# Patient Record
Sex: FEMALE | Race: WHITE | NOT HISPANIC OR LATINO | Employment: OTHER | URBAN - METROPOLITAN AREA
[De-identification: names, ages, dates, MRNs, and addresses within clinical notes are randomized per-mention and may not be internally consistent; named-entity substitution may affect disease eponyms.]

---

## 2017-04-10 ENCOUNTER — RECORDS - HEALTHEAST (OUTPATIENT)
Dept: LAB | Facility: CLINIC | Age: 37
End: 2017-04-10

## 2017-04-10 LAB
CHOLEST SERPL-MCNC: 169 MG/DL
FASTING STATUS PATIENT QL REPORTED: NORMAL
HDLC SERPL-MCNC: 52 MG/DL
LDLC SERPL CALC-MCNC: 104 MG/DL
TRIGL SERPL-MCNC: 66 MG/DL

## 2017-04-13 LAB
BKR LAB AP ABNORMAL BLEEDING: NO
BKR LAB AP BIRTH CONTROL/HORMONES: NORMAL
BKR LAB AP CERVICAL APPEARANCE: NORMAL
BKR LAB AP GYN ADEQUACY: NORMAL
BKR LAB AP GYN INTERPRETATION: NORMAL
BKR LAB AP HPV REFLEX: NORMAL
BKR LAB AP LMP: NORMAL
BKR LAB AP PATIENT STATUS: NORMAL
BKR LAB AP PREVIOUS ABNORMAL: NORMAL
BKR LAB AP PREVIOUS NORMAL: 2014
HIGH RISK?: NO
PATH REPORT.COMMENTS IMP SPEC: NORMAL
RESULT FLAG (HE HISTORICAL CONVERSION): NORMAL

## 2017-07-29 ENCOUNTER — HEALTH MAINTENANCE LETTER (OUTPATIENT)
Age: 37
End: 2017-07-29

## 2018-04-18 ENCOUNTER — RECORDS - HEALTHEAST (OUTPATIENT)
Dept: ADMINISTRATIVE | Facility: OTHER | Age: 38
End: 2018-04-18

## 2018-05-03 ENCOUNTER — RECORDS - HEALTHEAST (OUTPATIENT)
Dept: ADMINISTRATIVE | Facility: OTHER | Age: 38
End: 2018-05-03

## 2018-05-07 ENCOUNTER — RECORDS - HEALTHEAST (OUTPATIENT)
Dept: ADMINISTRATIVE | Facility: OTHER | Age: 38
End: 2018-05-07

## 2018-05-23 ENCOUNTER — RECORDS - HEALTHEAST (OUTPATIENT)
Dept: ADMINISTRATIVE | Facility: OTHER | Age: 38
End: 2018-05-23

## 2018-05-23 LAB
BKR LAB AP ABNORMAL BLEEDING: NO
BKR LAB AP BIRTH CONTROL/HORMONES: ABNORMAL
BKR LAB AP CERVICAL APPEARANCE: NORMAL
BKR LAB AP GYN ADEQUACY: ABNORMAL
BKR LAB AP GYN INTERPRETATION: ABNORMAL
BKR LAB AP HPV REFLEX: ABNORMAL
BKR LAB AP LMP: ABNORMAL
BKR LAB AP PATIENT STATUS: ABNORMAL
BKR LAB AP PREVIOUS ABNORMAL: ABNORMAL
BKR LAB AP PREVIOUS NORMAL: ABNORMAL
HIGH RISK?: NO
PATH REPORT.COMMENTS IMP SPEC: ABNORMAL
RESULT FLAG (HE HISTORICAL CONVERSION): ABNORMAL

## 2018-05-24 LAB
HPV SOURCE: NORMAL
HUMAN PAPILLOMA VIRUS 16 DNA: NEGATIVE
HUMAN PAPILLOMA VIRUS 18 DNA: NEGATIVE
HUMAN PAPILLOMA VIRUS FINAL DIAGNOSIS: NORMAL
HUMAN PAPILLOMA VIRUS OTHER HR: NEGATIVE
SPECIMEN DESCRIPTION: NORMAL

## 2018-07-02 ENCOUNTER — OFFICE VISIT - HEALTHEAST (OUTPATIENT)
Dept: RHEUMATOLOGY | Facility: CLINIC | Age: 38
End: 2018-07-02

## 2018-07-02 DIAGNOSIS — I73.00 RAYNAUD'S DISEASE WITHOUT GANGRENE: ICD-10-CM

## 2018-07-02 DIAGNOSIS — M54.50 CHRONIC BILATERAL LOW BACK PAIN WITHOUT SCIATICA: ICD-10-CM

## 2018-07-02 DIAGNOSIS — M25.50 POLYARTHRALGIA: ICD-10-CM

## 2018-07-02 DIAGNOSIS — G89.29 CHRONIC BILATERAL LOW BACK PAIN WITHOUT SCIATICA: ICD-10-CM

## 2018-07-02 LAB
ALBUMIN SERPL-MCNC: 4.5 G/DL (ref 3.5–5)
ALT SERPL W P-5'-P-CCNC: 10 U/L (ref 0–45)
BASOPHILS # BLD AUTO: 0 THOU/UL (ref 0–0.2)
BASOPHILS NFR BLD AUTO: 0 % (ref 0–2)
C REACTIVE PROTEIN LHE: <0.1 MG/DL (ref 0–0.8)
CK SERPL-CCNC: 83 U/L (ref 30–190)
CREAT SERPL-MCNC: 0.84 MG/DL (ref 0.6–1.1)
EOSINOPHIL # BLD AUTO: 0 THOU/UL (ref 0–0.4)
EOSINOPHIL NFR BLD AUTO: 1 % (ref 0–6)
ERYTHROCYTE [DISTWIDTH] IN BLOOD BY AUTOMATED COUNT: 14 % (ref 11–14.5)
ERYTHROCYTE [SEDIMENTATION RATE] IN BLOOD BY WESTERGREN METHOD: 14 MM/HR (ref 0–20)
GFR SERPL CREATININE-BSD FRML MDRD: >60 ML/MIN/1.73M2
HCT VFR BLD AUTO: 33.5 % (ref 35–47)
HGB BLD-MCNC: 11 G/DL (ref 12–16)
LYMPHOCYTES # BLD AUTO: 2.1 THOU/UL (ref 0.8–4.4)
LYMPHOCYTES NFR BLD AUTO: 25 % (ref 20–40)
MCH RBC QN AUTO: 26.6 PG (ref 27–34)
MCHC RBC AUTO-ENTMCNC: 32.9 G/DL (ref 32–36)
MCV RBC AUTO: 81 FL (ref 80–100)
MONOCYTES # BLD AUTO: 0.5 THOU/UL (ref 0–0.9)
MONOCYTES NFR BLD AUTO: 6 % (ref 2–10)
NEUTROPHILS # BLD AUTO: 5.7 THOU/UL (ref 2–7.7)
NEUTROPHILS NFR BLD AUTO: 69 % (ref 50–70)
PLATELET # BLD AUTO: 329 THOU/UL (ref 140–440)
PMV BLD AUTO: 7.6 FL (ref 7–10)
RBC # BLD AUTO: 4.14 MILL/UL (ref 3.8–5.4)
RHEUMATOID FACT SERPL-ACNC: <15 IU/ML (ref 0–30)
WBC: 8.3 THOU/UL (ref 4–11)

## 2018-07-03 LAB
ANA SER QL: 0.3 U
CCP AB SER IA-ACNC: <0.5 U/ML
HBV SURFACE AG SERPL QL IA: NEGATIVE
HCV AB SERPL QL IA: NEGATIVE

## 2018-07-04 LAB — ANCA IGG TITR SER IF: NORMAL {TITER}

## 2018-09-05 ENCOUNTER — OFFICE VISIT - HEALTHEAST (OUTPATIENT)
Dept: RHEUMATOLOGY | Facility: CLINIC | Age: 38
End: 2018-09-05

## 2018-09-05 DIAGNOSIS — M25.50 POLYARTHRALGIA: ICD-10-CM

## 2018-10-26 ENCOUNTER — RECORDS - HEALTHEAST (OUTPATIENT)
Dept: LAB | Facility: CLINIC | Age: 38
End: 2018-10-26

## 2018-10-26 LAB
ALBUMIN SERPL-MCNC: 4 G/DL (ref 3.5–5)
ANION GAP SERPL CALCULATED.3IONS-SCNC: 6 MMOL/L (ref 5–18)
BUN SERPL-MCNC: 17 MG/DL (ref 8–22)
CALCIUM SERPL-MCNC: 9.7 MG/DL (ref 8.5–10.5)
CHLORIDE BLD-SCNC: 106 MMOL/L (ref 98–107)
CO2 SERPL-SCNC: 26 MMOL/L (ref 22–31)
CREAT SERPL-MCNC: 0.71 MG/DL (ref 0.6–1.1)
GFR SERPL CREATININE-BSD FRML MDRD: >60 ML/MIN/1.73M2
GLUCOSE BLD-MCNC: 93 MG/DL (ref 70–125)
PHOSPHATE SERPL-MCNC: 3.6 MG/DL (ref 2.5–4.5)
POTASSIUM BLD-SCNC: 4.6 MMOL/L (ref 3.5–5)
SODIUM SERPL-SCNC: 138 MMOL/L (ref 136–145)
TSH SERPL DL<=0.005 MIU/L-ACNC: 1.47 UIU/ML (ref 0.3–5)

## 2019-01-28 ENCOUNTER — OFFICE VISIT (OUTPATIENT)
Dept: DERMATOLOGY | Facility: CLINIC | Age: 39
End: 2019-01-28
Payer: COMMERCIAL

## 2019-01-28 DIAGNOSIS — L70.8 OTHER ACNE: Primary | ICD-10-CM

## 2019-01-28 DIAGNOSIS — L30.1 DYSHIDROTIC DERMATITIS: ICD-10-CM

## 2019-01-28 RX ORDER — EMOLLIENT BASE
CREAM (GRAM) TOPICAL PRN
Qty: 113 G | Refills: 5 | Status: SHIPPED | OUTPATIENT
Start: 2019-01-28 | End: 2020-01-28

## 2019-01-28 RX ORDER — CLINDAMYCIN PHOSPHATE 10 UG/ML
LOTION TOPICAL EVERY MORNING
Qty: 60 ML | Refills: 3 | Status: SHIPPED | OUTPATIENT
Start: 2019-01-28 | End: 2019-04-28

## 2019-01-28 RX ORDER — BORIC ACID
POWDER (GRAM) MISCELLANEOUS
Qty: 500 G | Refills: 3 | Status: SHIPPED | OUTPATIENT
Start: 2019-01-28 | End: 2020-08-31

## 2019-01-28 ASSESSMENT — PAIN SCALES - GENERAL: PAINLEVEL: NO PAIN (0)

## 2019-01-28 NOTE — LETTER
1/28/2019       RE: Zaida Zelaya  [address] [facility] [address]     Dear Colleague,    Thank you for referring your patient, Zaida Zelaya, to the [facility] DERMATOLOGY at [facility]. Please see a copy of my visit note below.    [facility] Dermatology Note      Dermatology Problem List:    Specialty Problems     None          CC:   Derm Problem (Zaida is here for plaque psoriasis and black lines in fingernails .)        Encounter Date: Jan 28, 2019    History of Present Illness:  Ms. Zaida Zelaya is a 38 year old female who presents as a referral from [place].  Patient since 2013 recurrent dermatitis on hands and feet and sometimes on scalp.Sometimes blistering on palms of hands with redness and itchy and sometimes on the feet. Usually ends up with thick, tight skin and similar lesions periauricular and belly button      Past Medical History:   Patient Active Problem List   Diagnosis     Anxiety     Major depression, recurrent (H)     Migraine     Fibromyalgia     Hyperlipidemia with target LDL less than 130     Uncomplicated varicose veins     Chronic pain     Dysthymic disorder     Pain in joint, lower leg     Synovitis and tenosynovitis     Tobacco use disorder     Raynaud's syndrome     No past medical history on file.    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  She has been smoking about 1.00 pack per day. she has never used smokeless tobacco. She reports that she does not drink alcohol or use drugs.      Family History:  Family History   Problem Relation Age of Onset     Thyroid Disease Mother      Eye Disorder Mother 50        glaucoma     Depression Father      Neurologic Disorder Father         migraines     Gastrointestinal Disease Father 50        had a colon polyp  removed     Asthma Maternal Grandmother 61     Arthritis Maternal Grandmother      Allergies Maternal Grandmother      Alcohol/Drug Maternal Grandfather 61        liver failure     Asthma Paternal Grandmother      Arthritis Paternal Grandmother      Cancer Paternal Grandmother      Cancer Paternal Grandfather 84        melonoma     Alzheimer Disease Paternal Grandfather        Medications:  Current Outpatient Medications   Medication Sig Dispense Refill     Amphetamine-Dextroamphetamine (ADDERALL PO) Take 10 mg by mouth daily In afternoon       Amphetamine-Dextroamphetamine (ADDERALL XR PO) Take 20 mg by mouth every morning       Cholecalciferol (VITAMIN D3 PO) Take 7,000 Units by mouth daily       Cranberry 1000 MG CAPS Take 1,000 mg by mouth daily. 30 capsule      ESCITALOPRAM OXALATE PO Take 20 mg by mouth daily       GABAPENTIN PO Take 300 mg by mouth 2 times daily       IBUPROFEN PO Take 800 mg by mouth every 8 hours as needed for moderate pain       Iron Polysacch [id number] (POLY-IRON 150 FORTE) 150-0.025-1 MG CAPS TAKE ONE CAPSULE BY MOUTH DAILY WITH A MEAL       LORazepam (ATIVAN) 0.5 MG tablet Take 1 tablet (0.5 mg) by mouth every 8 hours as needed for anxiety 20 tablet 0     mupirocin (BACTROBAN) 2 % ointment Also to affected crusted areas around the mouth three times daily for 5 days. 22 g 1     SUMAtriptan (IMITREX) 100 MG tablet Take 1 tablet (100 mg) by mouth at onset of headache for migraine May repeat in 2 hours if needed: max 2/day; average number of headaches monthly 4 18 tablet 3     TRAMADOL HCL PO Take 50 mg by mouth 2 times daily        venlafaxine (EFFEXOR-XR) 37.5 MG 24 hr capsule Take 37.5 mg by mouth daily       MAGNESIUM GLUCONATE PO Take 1 tablet by mouth daily        Multiple Vitamin (MULTI-VITAMINS) TABS Take 1 tablet by mouth Once a Day       ondansetron (ZOFRAN) 4 MG tablet TAKE 1 TABLET BY MOUTH EVERY 8 HOURS  2     oxyCODONE-acetaminophen (PERCOCET)  MG per tablet Take 1  tablet by mouth 2 times daily       Review of Systems:  -As per HPI  -Constitutional: The patient denies fatigue, fevers, chills, unintended weight loss, and night sweats.  -HEENT: Patient denies nonhealing oral sores.  -Skin: As above in HPI. No additional skin concerns.    Physical exam:  Vitals: There were no vitals taken for this visit.  GEN: This is a well developed, well-nourished female in no acute distress, in a pleasant mood.    SKIN: Full skin, which includes the head/face, both arms, chest, back, abdomen,both legs, genitalia and/or groin buttocks, digits and/or nails, was examined.  Hands in the moment slightly hyperkeratotic, but no signs for active dermatitis  On the feet interdigital and plantar some whitish wet desquamation and some hyperkeratosis  No clear signs on nails for psoriasis and no psoriasis lesions sacral and scalp  On the face scars from an acne excoriee and as well some excoriations.  Some erythema over belly button    -No other lesions of concern on areas examined.     Impression/Plan:    1) recurrent dermatitis with dyshidrosis and hyperkeratosis on hands and feet (sometimes scalp)    DDx hyperhidrosis/allergic contact dermatitis, unlikely psoriasis    Use boric acid powder to disinfect and keep feet dry    Use on hands and feet Vanicream cream    2) scars of excoriated acne    Evaluate with cosmetic team the possibility of peeling    Continue Tretinoin Cream and in the morning Clindamycine Lotion and BP 5% wash    Follow-up with cosmetic team    Again, thank you for allowing me to participate in the care of your patient.      Sincerely,    Morris Rojas MD

## 2019-01-29 NOTE — NURSING NOTE
Dermatology Rooming Note    Zaida Zelaya's goals for this visit include:   Chief Complaint   Patient presents with     Derm Problem     Zaida is here for plaque psoriasis and black lines in fingernails .     Jerica Mathur, CMA

## 2019-01-29 NOTE — PROGRESS NOTES
[facility] Dermatology Note      Dermatology Problem List:    Specialty Problems     None          CC:   Derm Problem (Zaida is here for plaque psoriasis and black lines in fingernails .)        Encounter Date: Jan 28, 2019    History of Present Illness:  Ms. Zaida Zelaya is a 38 year old female who presents as a referral from [place].  Patient since 2013 recurrent dermatitis on hands and feet and sometimes on scalp.Sometimes blistering on palms of hands with redness and itchy and sometimes on the feet. Usually ends up with thick, tight skin and similar lesions periauricular and belly button      Past Medical History:   Patient Active Problem List   Diagnosis     Anxiety     Major depression, recurrent (H)     Migraine     Fibromyalgia     Hyperlipidemia with target LDL less than 130     Uncomplicated varicose veins     Chronic pain     Dysthymic disorder     Pain in joint, lower leg     Synovitis and tenosynovitis     Tobacco use disorder     Raynaud's syndrome     No past medical history on file.    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  She has been smoking about 1.00 pack per day. she has never used smokeless tobacco. She reports that she does not drink alcohol or use drugs.      Family History:  Family History   Problem Relation Age of Onset     Thyroid Disease Mother      Eye Disorder Mother 50        glaucoma     Depression Father      Neurologic Disorder Father         migraines     Gastrointestinal Disease Father 50        had a colon polyp removed     Asthma Maternal Grandmother 61     Arthritis Maternal Grandmother      Allergies Maternal Grandmother      Alcohol/Drug Maternal Grandfather 61        liver failure     Asthma Paternal Grandmother      Arthritis Paternal Grandmother      Cancer Paternal  Grandmother      Cancer Paternal Grandfather 84        melonoma     Alzheimer Disease Paternal Grandfather        Medications:  Current Outpatient Medications   Medication Sig Dispense Refill     Amphetamine-Dextroamphetamine (ADDERALL PO) Take 10 mg by mouth daily In afternoon       Amphetamine-Dextroamphetamine (ADDERALL XR PO) Take 20 mg by mouth every morning       Cholecalciferol (VITAMIN D3 PO) Take 7,000 Units by mouth daily       Cranberry 1000 MG CAPS Take 1,000 mg by mouth daily. 30 capsule      ESCITALOPRAM OXALATE PO Take 20 mg by mouth daily       GABAPENTIN PO Take 300 mg by mouth 2 times daily       IBUPROFEN PO Take 800 mg by mouth every 8 hours as needed for moderate pain       Iron Polysacch [id number] (POLY-IRON 150 FORTE) 150-0.025-1 MG CAPS TAKE ONE CAPSULE BY MOUTH DAILY WITH A MEAL       LORazepam (ATIVAN) 0.5 MG tablet Take 1 tablet (0.5 mg) by mouth every 8 hours as needed for anxiety 20 tablet 0     mupirocin (BACTROBAN) 2 % ointment Also to affected crusted areas around the mouth three times daily for 5 days. 22 g 1     SUMAtriptan (IMITREX) 100 MG tablet Take 1 tablet (100 mg) by mouth at onset of headache for migraine May repeat in 2 hours if needed: max 2/day; average number of headaches monthly 4 18 tablet 3     TRAMADOL HCL PO Take 50 mg by mouth 2 times daily        venlafaxine (EFFEXOR-XR) 37.5 MG 24 hr capsule Take 37.5 mg by mouth daily       MAGNESIUM GLUCONATE PO Take 1 tablet by mouth daily        Multiple Vitamin (MULTI-VITAMINS) TABS Take 1 tablet by mouth Once a Day       ondansetron (ZOFRAN) 4 MG tablet TAKE 1 TABLET BY MOUTH EVERY 8 HOURS  2     oxyCODONE-acetaminophen (PERCOCET)  MG per tablet Take 1 tablet by mouth 2 times daily           Review of Systems:  -As per HPI  -Constitutional: The patient denies fatigue, fevers, chills, unintended weight loss, and night sweats.  -HEENT: Patient denies nonhealing oral sores.  -Skin: As above in HPI. No additional skin  concerns.    Physical exam:  Vitals: There were no vitals taken for this visit.  GEN: This is a well developed, well-nourished female in no acute distress, in a pleasant mood.    SKIN: Full skin, which includes the head/face, both arms, chest, back, abdomen,both legs, genitalia and/or groin buttocks, digits and/or nails, was examined.  Hands in the moment slightly hyperkeratotic, but no signs for active dermatitis  On the feet interdigital and plantar some whitish wet desquamation and some hyperkeratosis  No clear signs on nails for psoriasis and no psoriasis lesions sacral and scalp  On the face scars from an acne excoriee and as well some excoriations.  Some erythema over belly button    -No other lesions of concern on areas examined.     Impression/Plan:    1) recurrent dermatitis with dyshidrosis and hyperkeratosis on hands and feet (sometimes scalp)    DDx hyperhidrosis/allergic contact dermatitis, unlikely psoriasis    Use boric acid powder to disinfect and keep feet dry    Use on hands and feet Vanicream cream    2) scars of excoriated acne    Evaluate with cosmetic team the possibility of peeling    Continue Tretinoin Cream and in the morning Clindamycine Lotion and BP 5% wash        Follow-up with cosmetic team

## 2019-02-04 DIAGNOSIS — L70.8 OTHER ACNE: ICD-10-CM

## 2019-02-06 RX ORDER — CLINDAMYCIN PHOSPHATE 10 UG/ML
LOTION TOPICAL
Qty: 60 ML | Refills: 3 | OUTPATIENT
Start: 2019-02-06

## 2019-02-12 ENCOUNTER — TELEPHONE (OUTPATIENT)
Dept: DERMATOLOGY | Facility: CLINIC | Age: 39
End: 2019-02-12

## 2019-02-12 NOTE — TELEPHONE ENCOUNTER
[facility] Call Center    Phone Message    May a detailed message be left on voicemail: yes    Reason for Call: Medication Question or concern regarding medication   Prescription Clarification  Name of Medication: clindamycin (CLEOCIN T) 1 % external lotion   Prescribing Provider: Dr. Barrera   Pharmacy:[facility]--[address] [place] [address]   Requesting one day supply. Please call as soon as possible. [phone]    Action Taken: Message routed to:  [facility] ([facility] Derm Clinic

## 2019-07-27 ENCOUNTER — HOSPITAL ENCOUNTER (EMERGENCY)
Facility: CLINIC | Age: 39
Discharge: HOME | End: 2019-07-27
Attending: PHYSICIAN ASSISTANT | Admitting: PHYSICIAN ASSISTANT
Payer: COMMERCIAL

## 2019-07-27 VITALS
OXYGEN SATURATION: 100 % | WEIGHT: 132 LBS | BODY MASS INDEX: 23.39 KG/M2 | SYSTOLIC BLOOD PRESSURE: 143 MMHG | TEMPERATURE: 98.6 F | DIASTOLIC BLOOD PRESSURE: 100 MMHG | HEIGHT: 63 IN

## 2019-07-27 DIAGNOSIS — Z86.79 HISTORY OF RAYNAUD'S SYNDROME: ICD-10-CM

## 2019-07-27 DIAGNOSIS — R21 RASH AND NONSPECIFIC SKIN ERUPTION: ICD-10-CM

## 2019-07-27 DIAGNOSIS — Z76.0 ENCOUNTER FOR MEDICATION REFILL: ICD-10-CM

## 2019-07-27 PROCEDURE — 99214 OFFICE O/P EST MOD 30 MIN: CPT | Mod: Z6 | Performed by: PHYSICIAN ASSISTANT

## 2019-07-27 PROCEDURE — G0463 HOSPITAL OUTPT CLINIC VISIT: HCPCS | Performed by: PHYSICIAN ASSISTANT

## 2019-07-27 RX ORDER — MUPIROCIN 20 MG/G
OINTMENT TOPICAL 3 TIMES DAILY
Qty: 1 G | Refills: 0 | Status: SHIPPED | OUTPATIENT
Start: 2019-07-27 | End: 2019-08-01

## 2019-07-27 RX ORDER — TRAMADOL HYDROCHLORIDE 50 MG/1
50 TABLET ORAL 2 TIMES DAILY PRN
Qty: 4 TABLET | Refills: 0 | Status: SHIPPED | OUTPATIENT
Start: 2019-07-27 | End: 2020-08-31

## 2019-07-27 ASSESSMENT — MIFFLIN-ST. JEOR: SCORE: 1242.88

## 2019-07-27 ASSESSMENT — ENCOUNTER SYMPTOMS
MYALGIAS: 1
WEAKNESS: 0
FEVER: 0
ARTHRALGIAS: 1
NUMBNESS: 0

## 2019-07-27 NOTE — ED AVS SNAPSHOT
[facility] Emergency Department  [address] [facility] [address]  Phone:  [phone]  Fax:  [phone]                                    Zaida Zelaya   MRN: [id number]    Department:  [facility] Emergency Department   Date of Visit:  7/27/2019           After Visit Summary Signature Page    I have received my discharge instructions, and my questions have been answered. I have discussed any challenges I see with this plan with the nurse or doctor.    ..........................................................................................................................................  Patient/Patient Representative Signature      ..........................................................................................................................................  Patient Representative Print Name and Relationship to Patient    ..................................................               ................................................  Date                                   Time    ..........................................................................................................................................  Reviewed by Signature/Title    ...................................................              ..............................................  Date                                               Time          22EPIC Rev 08/18

## 2019-07-28 NOTE — ED PROVIDER NOTES
"  History     Chief Complaint   Patient presents with     Rash     has had and been seen for many times, is requesting antibiotic      HPI  Zaida Zelaya is a 38 year old female who presents the urgent care today for 2 issues patient's first issue is that she has a history of ray nods syndrome and sees a dermatologist and rheumatologist for psoriasis-like rash that she gets several times a year.  Patient states though that she gets a lot of sloughing and buildup of what appears to be \"skin like flaking\" all over her body.  Patient also notes that she gets areas of redness and warmth that then resolve on their own throughout her body.  Patient concerned that she needs some sort of antibiotic for this and she does have a history of MRSA.  Patient also states that her joints about flaring up a little bit with her fibromyalgia and Raynolds on until she can see her rheumatologist dermatologist on Monday she was wondering if she can get a few tramadol which she is currently out of.  Patient got all of her other medications refilled recently.  Patient denies fevers, purulent drainage, swelling, fluid like collection, crusting or vesicular rash.  Started this a few days ago.  Patient denies any chest pain, headaches, dizziness, confusion, ear pain, sore throat, sinus pain, shortness of breath, abdominal pain, nausea or vomiting.      Allergies:  No Known Allergies    Problem List:    Patient Active Problem List    Diagnosis Date Noted     Raynaud's syndrome 02/23/2014     Priority: Medium     Has seen Rheum x 2       Anxiety 02/13/2014     Priority: Medium     Major depression, recurrent (H) 02/13/2014     Priority: Medium     Migraine 02/13/2014     Priority: Medium     Fibromyalgia 02/13/2014     Priority: Medium     Hyperlipidemia with target LDL less than 130 02/13/2014     Priority: Medium     Diagnosis updated by automated process. Provider to review and confirm.       Chronic pain 09/30/2013     Priority: Medium "     Tobacco use disorder 04/21/2011     Priority: Medium     Synovitis and tenosynovitis 02/28/2011     Priority: Medium     Dysthymic disorder 11/24/2010     Priority: Medium     Uncomplicated varicose veins 11/12/2010     Priority: Medium     Pain in joint, lower leg 11/12/2010     Priority: Medium        Past Medical History:    History reviewed. No pertinent past medical history.    Past Surgical History:    Past Surgical History:   Procedure Laterality Date     CARPAL TUNNEL RELEASE RT/LT  2013    left only; [facility]      ENLARGE BREAST WITH IMPLANT  2007       Family History:    Family History   Problem Relation Age of Onset     Thyroid Disease Mother      Eye Disorder Mother 50        glaucoma     Depression Father      Neurologic Disorder Father         migraines     Gastrointestinal Disease Father 50        had a colon polyp removed     Asthma Maternal Grandmother 61     Arthritis Maternal Grandmother      Allergies Maternal Grandmother      Alcohol/Drug Maternal Grandfather 61        liver failure     Asthma Paternal Grandmother      Arthritis Paternal Grandmother      Cancer Paternal Grandmother      Cancer Paternal Grandfather 84        melonoma     Alzheimer Disease Paternal Grandfather        Social History:  Marital Status:   [4]  Social History     Tobacco Use     Smoking status: Current Every Day Smoker     Packs/day: 1.00     Types: Cigarettes     Smokeless tobacco: Never Used   Substance Use Topics     Alcohol use: No     Drug use: No        Medications:      mupirocin (BACTROBAN) 2 % external ointment   traMADol (ULTRAM) 50 MG tablet   Amphetamine-Dextroamphetamine (ADDERALL PO)   Amphetamine-Dextroamphetamine (ADDERALL XR PO)   benzoyl peroxide 5 % external liquid   boric acid topical POWD powder   Cholecalciferol (VITAMIN D3 PO)   Cranberry 1000 MG CAPS   emollient (VANICREAM) external cream   ESCITALOPRAM OXALATE PO   GABAPENTIN PO   IBUPROFEN PO   Iron Polysacch  "Hafur-Q25-FE (POLY-IRON 150 FORTE) 150-0.025-1 MG CAPS   LORazepam (ATIVAN) 0.5 MG tablet   MAGNESIUM GLUCONATE PO   Multiple Vitamin (MULTI-VITAMINS) TABS   mupirocin (BACTROBAN) 2 % ointment   ondansetron (ZOFRAN) 4 MG tablet   SUMAtriptan (IMITREX) 100 MG tablet   TRAMADOL HCL PO   venlafaxine (EFFEXOR-XR) 37.5 MG 24 hr capsule         Review of Systems   Constitutional: Negative for fever.   Musculoskeletal: Positive for arthralgias and myalgias.        History of fibromyalgia and raynaud's    Skin: Positive for rash.   Neurological: Negative for weakness and numbness.   All other systems reviewed and are negative.      Physical Exam   BP: (!) 143/100  Heart Rate: 100  Temp: 98.6  F (37  C)  Height: 160 cm (5' 3\")  Weight: 59.9 kg (132 lb)  SpO2: 100 %      Physical Exam   Constitutional: She is oriented to person, place, and time. She appears well-developed and well-nourished. No distress.   Cardiovascular: Normal rate, regular rhythm, normal heart sounds and intact distal pulses.   Pulmonary/Chest: Effort normal and breath sounds normal.   Musculoskeletal: Normal range of motion.   Neurological: She is alert and oriented to person, place, and time. She has normal strength. No sensory deficit. GCS eye subscore is 4. GCS verbal subscore is 5. GCS motor subscore is 6.   Skin: Skin is warm and intact. Rash (flaky rash noted around the mouth, external nostrils and bilateral ears. mild crusting noted to the ears. ) noted. No purpura noted. Rash is not macular, not papular, not maculopapular, not nodular, not pustular, not vesicular and not urticarial. She is not diaphoretic.   Psychiatric: She has a normal mood and affect. Her behavior is normal. Judgment and thought content normal.   Nursing note and vitals reviewed.      ED Course        Procedures              Critical Care time:  none               No results found for this or any previous visit (from the past 24 hour(s)).    Medications - No data to " display    Assessments & Plan (with Medical Decision Making)     I have reviewed the nursing notes.    I have reviewed the findings, diagnosis, plan and need for follow up with the patient.   38-year-old female presents the urgent care with a couple of concerns.  Patient went like a refill on her tramadol until she can see her rheumatologist and dermatologist on Monday since her fibromyalgia and Raynaud's along with her rash that they have been trying to figure out for a while seems to be flaring up.  Patient states she has not tramadol for a while and notes that the Tylenol over-the-counter does not seem to be working.  Patient also concerned that she may have a secondary infection such she does have a history of MRSA with all of the flaking and irritation to the ears, around the mouth and nostrils.  See exam findings above.  Patient given mupirocin ointment to use for this concern.  No concern for impetigo or cellulitis at this time.  Patient does not appear to have any abscesses or boils on extremity.  Patient given for tramadol for pain and informed that she needs to follow-up with her rheumatologist or primary care provider for further pain management.  No imaging or lab work indicated at this time.  Patient to return if signs or symptoms of cellulitis occur these were discussed with patient given on discharge paperwork.  Patient discharged in stable condition.       Medication List      Modified    * mupirocin 2 % external ointment  Commonly known as:  BACTROBAN  Also to affected crusted areas around the mouth three times daily for 5 days.  What changed:  Another medication with the same name was added. Make sure you understand how and when to take each.     * mupirocin 2 % external ointment  Commonly known as:  BACTROBAN  Topical, 3 TIMES DAILY, Apply to affected areas  What changed:  You were already taking a medication with the same name, and this prescription was added. Make sure you understand how and  when to take each.     * traMADol 50 MG tablet  Commonly known as:  ULTRAM  50 mg, Oral, 2 TIMES DAILY PRN  What changed:  You were already taking a medication with the same name, and this prescription was added. Make sure you understand how and when to take each.     * TRAMADOL HCL PO  What changed:  Another medication with the same name was added. Make sure you understand how and when to take each.         * This list has 4 medication(s) that are the same as other medications prescribed for you. Read the directions carefully, and ask your doctor or other care provider to review them with you.                Final diagnoses:   Rash and nonspecific skin eruption   History of Raynaud's syndrome - with pain   Encounter for medication refill       7/27/2019   [facility] EMERGENCY DEPARTMENT     Kenzie Cowan PA-C  07/27/19 [id number]

## 2019-07-28 NOTE — DISCHARGE INSTRUCTIONS
Follow-up with dermatologist and rheumatologist in 2 days for further evaluation and recheck.    Use medications with caution do not drive or operate machine-year-old male taking Ultram.    Patient return to the emergency department if signs or symptoms of cellulitis occur this includes redness, swelling, hot to the touch, purulent drainage, fevers or red streaking.

## 2019-12-05 ENCOUNTER — RECORDS - HEALTHEAST (OUTPATIENT)
Dept: LAB | Facility: CLINIC | Age: 39
End: 2019-12-05

## 2019-12-05 LAB
AMPHETAMINES UR QL SCN: ABNORMAL
BARBITURATES UR QL: ABNORMAL
BENZODIAZ UR QL: ABNORMAL
CANNABINOIDS UR QL SCN: ABNORMAL
COCAINE UR QL: ABNORMAL
CREAT UR-MCNC: 126.5 MG/DL
METHADONE UR QL SCN: ABNORMAL
OPIATES UR QL SCN: ABNORMAL
OXYCODONE UR QL: ABNORMAL
PCP UR QL SCN: ABNORMAL

## 2019-12-08 LAB
AMPHET UR-MCNC: >5000 NG/ML
MDA UR-MCNC: <200 NG/ML
MDEA UR-MCNC: <200 NG/ML
MDMA UR-MCNC: <200 NG/ML
METHAMPHET UR-MCNC: NORMAL NG/ML
PHENTERMINE UR CFM-MCNC: <200 NG/ML

## 2020-01-14 ENCOUNTER — RECORDS - HEALTHEAST (OUTPATIENT)
Dept: LAB | Facility: CLINIC | Age: 40
End: 2020-01-14

## 2020-01-17 LAB
AMPHET UR-MCNC: >5000 NG/ML
EDDP UR-MCNC: <10 NG/ML
MDA UR-MCNC: <200 NG/ML
MDEA UR-MCNC: <200 NG/ML
MDMA UR-MCNC: <200 NG/ML
METHADONE UR-MCNC: <10 NG/ML
METHAMPHET UR-MCNC: <200 NG/ML
PHENTERMINE UR CFM-MCNC: <200 NG/ML

## 2020-07-27 ENCOUNTER — MYC MEDICAL ADVICE (OUTPATIENT)
Dept: DERMATOLOGY | Facility: CLINIC | Age: 40
End: 2020-07-27

## 2020-07-28 ENCOUNTER — VIRTUAL VISIT (OUTPATIENT)
Dept: PHARMACY | Facility: PHYSICIAN GROUP | Age: 40
End: 2020-07-28
Payer: COMMERCIAL

## 2020-07-28 DIAGNOSIS — Z78.9 TAKES DIETARY SUPPLEMENTS: ICD-10-CM

## 2020-07-28 DIAGNOSIS — F17.200 TOBACCO USE DISORDER: ICD-10-CM

## 2020-07-28 DIAGNOSIS — M79.7 FIBROMYALGIA: ICD-10-CM

## 2020-07-28 DIAGNOSIS — G89.4 CHRONIC PAIN SYNDROME: ICD-10-CM

## 2020-07-28 DIAGNOSIS — F41.9 ANXIETY: ICD-10-CM

## 2020-07-28 DIAGNOSIS — G43.909 MIGRAINE WITHOUT STATUS MIGRAINOSUS, NOT INTRACTABLE, UNSPECIFIED MIGRAINE TYPE: ICD-10-CM

## 2020-07-28 DIAGNOSIS — F33.9 RECURRENT MAJOR DEPRESSIVE DISORDER, REMISSION STATUS UNSPECIFIED (H): Primary | ICD-10-CM

## 2020-07-28 PROCEDURE — 99605 MTMS BY PHARM NP 15 MIN: CPT | Mod: TEL | Performed by: PHARMACIST

## 2020-07-28 PROCEDURE — 99607 MTMS BY PHARM ADDL 15 MIN: CPT | Mod: TEL | Performed by: PHARMACIST

## 2020-07-28 NOTE — PROGRESS NOTES
MTM Encounter  SUBJECTIVE/OBJECTIVE:                           Zaida Zelaya is a 39 year old female called for an initial visit. She was referred to me from Chitra Tomlin PA-C.    Patient consented to a telehealth visit: yes    Chief Complaint: Discuss medication options and pharmacogenomic testing for depression and anxiety. Patient is primarily concerned about worsened depression and fatigue and wants to make sure she is on the right medications.     Allergies/ADRs: Reviewed in eCW, reports none, Chantix- nightmares   Tobacco:  reports that she has been smoking cigarettes. She has been smoking about 1.00 pack per day. She has never used smokeless tobacco.Tobacco Cessation Action Plan: Patient is interested in quiting. She had a lot of side effects with Chantix but is interested in trying Bupropion.  Alcohol: not currently using  Caffeine: 1-2 cups/day of coffee, quit drinking soda   PMH: Reviewed in EHR, significant for chronic pain, fibromyalgia, depression, anxiety, ADD, migraine, plaque psoriasis, galactorrhea, Raynaud's syndrome, tobacco dependence           Medication Adherence/Access:   Patient takes medications directly from bottles.  Patient takes medications 2 time(s) per day.   Medication barriers: history of dependence on medication, is doing better now that she is off controlled substances.     Depression, Anxiety, ADD: Current medication(s): Venlafaxine  mg once daily, Lexapro 20 mg once daily  Recently increased venlafaxine dose but isn't sure if it is helping. Has been on Lexapro for a long time but hasn't really done enough. Uses brand name Lexapro. Takes both in the morning.     Previously tried medications: lorazepam, citalopram, sertraline, adderall, gabapentin              PENNY-7 12/4/19: 1, 1, 1, 2, 0, 1, 0    Pain, Migraines: Current medications: Ibuprofen 800 mg two-three times daily, Gabapentin 300 mg at bedtime, Imitrex   Doesn't like feeling groggy next morning. Migraines mainly  around menstrual cycles.     Migraines: Current medication(s):  Sumatriptan 100 mg as needed- typically splits in half  Ondansetron    Patient's triggers include: Menstruation and associated symptoms include: Nausea and Vomiting. Patient reports having 4-8 headache days per month. Current side effects include: none reported.     Supplements: Currently taking:   Ashwaganda- nutrarise just started this past month  Over 30 hormone support- red clove, black cohosh, sqavine, red raspberry (fruit), thistle, false unicorn, castaberry, liqurise root, donquil root, wilda leaf  Women's MVI daily    Not taking vitamin D currently.     Today's Vitals: There were no vitals taken for this visit.    ASSESSMENT:                          Medication Adherence: Good, no issues identified    Depression, Anxiety, ADD: Needs improvement. Patient is an appropriate candidate for pharmacogenomic testing.   Education Provided:  - Potential impact of pharmacokinetic and pharmacodynamic genetic variation on medication metabolism and action  - Reviewed genes and medications tested   - Reviewed what report will look like  - How information may be helpful in guiding treatment  - How results may be useful when reviewing other medications  - Limitations of test results on individual medication experience  Patient Consent Form reviewed with patient, patient provided opportunity to ask questions, confirmed understanding, and signed authorization.    Pain: Stable.     Migraines: Stable.     Supplements: Needs further assessment.       PLAN:                            1. Order entered on [employer] portal, patient will receive test kit in mail within 3 days.   2. Instructed patient on collecting sample and returning specimen via Fed-Ex in pre-labeled envelope.  3. PharmD will review results and recommendations with provider  4. PharmD will contact patient by phone to review results and patient will schedule follow-up with provider to implement  plan  5. Resume taking vitamin D    PharmD Follow-up: By phone in 1-3 weeks after GeneSight results return    I spent 50 minutes with this patient today. I offer these suggestions for consideration by Chitra Tomlin PA-C. A copy of the visit note was provided to the patient's primary care provider.    The patient declined a summary of these recommendations.     Angelina Borrero, PharmD  Medication Therapy Management Pharmacist  [facility]  Pager: [phone]    ----  Telemedicine Visit Details  Type of service:  Telephone visit  Start Time: 3:30 PM  End Time: 4:20 PM  Originating Location (pt. Location): Home  Distant Location (provider location):  [facility]  Mode of Communication:  Telephone

## 2020-07-29 ENCOUNTER — RECORDS - HEALTHEAST (OUTPATIENT)
Dept: LAB | Facility: CLINIC | Age: 40
End: 2020-07-29

## 2020-07-29 LAB
ALBUMIN SERPL-MCNC: 4.3 G/DL (ref 3.5–5)
ALP SERPL-CCNC: 66 U/L (ref 45–120)
ALT SERPL W P-5'-P-CCNC: 19 U/L (ref 0–45)
ANION GAP SERPL CALCULATED.3IONS-SCNC: 9 MMOL/L (ref 5–18)
AST SERPL W P-5'-P-CCNC: 19 U/L (ref 0–40)
BASOPHILS # BLD AUTO: 0.1 THOU/UL (ref 0–0.2)
BASOPHILS NFR BLD AUTO: 1 % (ref 0–2)
BILIRUB SERPL-MCNC: 0.2 MG/DL (ref 0–1)
BUN SERPL-MCNC: 12 MG/DL (ref 8–22)
CALCIUM SERPL-MCNC: 9.4 MG/DL (ref 8.5–10.5)
CHLORIDE BLD-SCNC: 104 MMOL/L (ref 98–107)
CO2 SERPL-SCNC: 24 MMOL/L (ref 22–31)
CREAT SERPL-MCNC: 0.83 MG/DL (ref 0.6–1.1)
EOSINOPHIL # BLD AUTO: 0.3 THOU/UL (ref 0–0.4)
EOSINOPHIL NFR BLD AUTO: 4 % (ref 0–6)
ERYTHROCYTE [DISTWIDTH] IN BLOOD BY AUTOMATED COUNT: 17.1 % (ref 11–14.5)
GFR SERPL CREATININE-BSD FRML MDRD: >60 ML/MIN/1.73M2
GLUCOSE BLD-MCNC: 83 MG/DL (ref 70–125)
HCT VFR BLD AUTO: 34.7 % (ref 35–47)
HGB BLD-MCNC: 10.6 G/DL (ref 12–16)
LYMPHOCYTES # BLD AUTO: 2 THOU/UL (ref 0.8–4.4)
LYMPHOCYTES NFR BLD AUTO: 31 % (ref 20–40)
MCH RBC QN AUTO: 26.4 PG (ref 27–34)
MCHC RBC AUTO-ENTMCNC: 30.5 G/DL (ref 32–36)
MCV RBC AUTO: 87 FL (ref 80–100)
MONOCYTES # BLD AUTO: 0.5 THOU/UL (ref 0–0.9)
MONOCYTES NFR BLD AUTO: 8 % (ref 2–10)
NEUTROPHILS # BLD AUTO: 3.6 THOU/UL (ref 2–7.7)
NEUTROPHILS NFR BLD AUTO: 55 % (ref 50–70)
PLATELET # BLD AUTO: 304 THOU/UL (ref 140–440)
PMV BLD AUTO: 11.5 FL (ref 8.5–12.5)
POTASSIUM BLD-SCNC: 4.5 MMOL/L (ref 3.5–5)
PROT SERPL-MCNC: 7.3 G/DL (ref 6–8)
RBC # BLD AUTO: 4.01 MILL/UL (ref 3.8–5.4)
SODIUM SERPL-SCNC: 137 MMOL/L (ref 136–145)
TSH SERPL DL<=0.005 MIU/L-ACNC: 2.02 UIU/ML (ref 0.3–5)
VIT B12 SERPL-MCNC: 344 PG/ML (ref 213–816)
WBC: 6.5 THOU/UL (ref 4–11)

## 2020-07-29 ASSESSMENT — MIFFLIN-ST. JEOR: SCORE: 1454.12

## 2020-07-30 LAB — 25(OH)D3 SERPL-MCNC: 30.6 NG/ML (ref 30–80)

## 2020-08-05 NOTE — TELEPHONE ENCOUNTER
Called to confirm in person appointment as telephone or in person for lesion of concern. Pt can decide what they would like to do. Number provided or patient can respond to [employer] message

## 2020-08-26 ENCOUNTER — VIRTUAL VISIT (OUTPATIENT)
Dept: PHARMACY | Facility: PHYSICIAN GROUP | Age: 40
End: 2020-08-26
Payer: COMMERCIAL

## 2020-08-26 DIAGNOSIS — F41.9 ANXIETY: ICD-10-CM

## 2020-08-26 DIAGNOSIS — F17.200 TOBACCO DEPENDENCE SYNDROME: ICD-10-CM

## 2020-08-26 DIAGNOSIS — E88.89 CYP2C9 POOR METABOLIZER (H): ICD-10-CM

## 2020-08-26 DIAGNOSIS — E88.89 CYP2B6 POOR METABOLIZER (H): ICD-10-CM

## 2020-08-26 DIAGNOSIS — Z15.89 HTR2A GG GENOTYPE: ICD-10-CM

## 2020-08-26 DIAGNOSIS — F33.9 RECURRENT MAJOR DEPRESSIVE DISORDER, REMISSION STATUS UNSPECIFIED (H): Primary | ICD-10-CM

## 2020-08-26 DIAGNOSIS — E88.89 INTERMEDIATE DRUG METABOLIZER ASSOCIATED WITH CYP3A4 ALLELE (H): ICD-10-CM

## 2020-08-26 DIAGNOSIS — E88.89 CYP2D6 INTERMEDIATE METABOLIZER (H): ICD-10-CM

## 2020-08-26 DIAGNOSIS — N39.0 URINARY TRACT INFECTION WITHOUT HEMATURIA, SITE UNSPECIFIED: ICD-10-CM

## 2020-08-26 PROCEDURE — 99607 MTMS BY PHARM ADDL 15 MIN: CPT | Mod: TEL | Performed by: PHARMACIST

## 2020-08-26 PROCEDURE — 99606 MTMS BY PHARM EST 15 MIN: CPT | Mod: TEL | Performed by: PHARMACIST

## 2020-08-26 NOTE — PROGRESS NOTES
MTM Encounter  SUBJECTIVE/OBJECTIVE:                           Zaida Zelaya is a 39 year old female called for a follow-up visit. She was referred to me from Chitra Tomlin PA-C.  Today's visit is a follow-up MTM visit from 2020.   Patient consented to a telehealth visit: yes    Chief Complaint: Review GeneSight results and discuss medication options for depression and anxiety..    Allergies/ADRs: Reviewed in EHR  Tobacco:  reports that she has been smoking cigarettes. She has been smoking about 1.00 pack per day. She has never used smokeless tobacco.Tobacco Cessation Action Plan: patient is interested in quiting but not tolerating bupropion and previously did not tolerate Chantix. Prefers to get anxiety under control first.   Alcohol: not currently using  Caffeine: 1-2 cups/day of coffee, quit drinking soda  PMH: Reviewed in EHR, significant for chronic pain, fibromyalgia, depression, anxiety, ADD, migraine, plaque psoriasis, galactorrhea, Raynaud's syndrome, tobacco dependence  Medications   Taking gabapentin 300 mg capsule, Si cap(s) orally 3 times a day   Taking Effexor XR 75 mg capsule, extended release, Si cap(s) orally once a day Start Date: 2020   Taking Desonide 0.05% cream, Si frederic applied topically 2 times a day   Taking Daily-Tyrone with Iron Multiple Vitamins with Iron tablet, Si tab(s) orally once a day Start Date: 2015   Taking Vitamin D3 5000 intl units capsule, Si cap(s) orally once a day   Taking tretinoin topical 0.025% cream, Si frederic applied topically once a day (at bedtime)   Refill BuPROPion Hydrochloride  mg/24 hours tablet, extended release, Si tab(s) orally every 24 hours Start Date: 2020   Taking Ondansetron Hydrochloride 4 mg tablet, Si tab(s) orally every 8 hours   Taking Imitrex 100 mg tablet, Si tab orally at onset of headache, may repeat after 2 hours prn, max 2 per day   Start SMZ-TMP  mg-160 mg tablet, Si  tab(s) orally every 12 hours Start Date: 2020 Stop Date: 2020   Taking ibuprofen 800 mg tablet, Si tab(s) orally 3 times a day        Medication Adherence/Access: No issues reported    Depression, Anxiety: Current medication(s): Venlafaxine ER 75 mg once daily, Bupropion  mg once daily  Switched from Lexapro to bupropion about a month ago and feels like her anxiety is worse on it and she is experiencing rapid heart rate. Describes her anxiety level going from a  0-2 to a 7. She wanted to try bupropion to see if it would help her quit smoking but now she wants to focus on getting anxiety under control before thinking about quitting smoking.   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 They carry two copies of the G allele. This genotype has been associated with an increased risk of adverse drug reactions with certain selective serotonin reuptake inhibitors. CYP2B6- Poor Metabolizer  *6/*6  CYP2B6*6 allele enzyme activity: Reduced  CYP2B6*6 allele enzyme activity: Reduced    This genotype is most consistent with the poor metabolizer phenotype. This patient may have reduced enzyme activity as compared to individuals with the normal phenotype.    CYP2C9- Poor Metabolizer  *2/*2  CYP2C9*2 allele enzyme activity: Reduced  CYP2C9*2 allele enzyme activity: Reduced    This genotype is most consistent with the poor metabolizer phenotype. This patient may have reduced enzyme activity as compared to individuals with the normal phenotype.    CYP2D6- Intermediate Metabolizer  *1/*9  CYP2D6*1 allele enzyme activity: Normal  CYP2D6*9 allele enzyme activity: Reduced    This genotype is most  consistent with the intermediate metabolizer phenotype. This patient may have reduced enzyme activity as compared to individuals with the normal phenotype.    CY- Intermediate Metabolizer  *1/*22  CY*1 allele enzyme activity: Normal  CY*22 allele enzyme activity: Reduced    This genotype is most consistent with the intermediate metabolizer phenotype. This patient may have reduced enzyme activity as compared to individuals with the normal phenotype.                   CLINICAL CONSIDERATIONS  1: Serum level may be too high, lower doses may be required.  4: Genotype may impact drug mechanism of action and result in reduced efficacy.  6: Use of this drug may increase risk of side effects.  8: FDA label identifies a potential gene-drug interaction for this medication.  10: This medication does not have clinically proven genetic markers that allow it to be categorized.      UTI: Patient called clinic earlier today to ask about getting an antibiotic for UTI. She hasn't heard anything yet and is wondering if this was addressed by her doctor.       Supplements: Last visit mentioned she was taking new supplements: Ashwagonda and Over 30 Hormone supplement. She wants to make sure there are not any interactions with her other medications.   Ashwaganda NutriRise product, per 2 capsules: ashwaganda 1300 mg and black pepper 10 mg  Over 30 Hormone Solution: black cohosh 160 mg, blessed thistle herb powder 50 mg, chasteberry extract 50 mg, dong quai extract 150 mg, licorice extract 150 mg, mexican wild yam extract 15 mg, red clover extract 400 mg, wilda extract 200 mg, red raspberry 50 mg, soy isoflavones 30 mg, trans-resveratrol extract 1 mg    Today's Vitals: There were no vitals taken for this visit.   Last Clinic Vitals: 2020- Ht 63.25, Wt 178.8, BMI 31.42, Pulse 80, /76, Arm / Cuff size Right, reg    ASSESSMENT:                            # Medication Adherence: Good, no issues identified.     #  Depression, Anxiety: Needs improvement. Reviewed Genesight results with patient.  Discussed that results will not tell us which drugs will work but can give us some insight into dosing and side effects based on his individual metabolism of each medication. Given current and previous therapy, notable Genesight results include:    - Increased sensitivity at the serotonin 2A receptor (G/G allele): may be associated with increased risk of side effects with certain SSRIs. This is consistent with patient's history of poorly tolerating SSRIs in the past. She may benefit from using agents with a different mechanism (SNRI, atypical, TCA).   - Poor Metabolizer at CYP2B: medications metabolized through this pathway (i.e. bupropion) may result in higher serum levels than expected and may have an increased risk of side effects. Patient's worsening anxiety after starting bupropion is consistent with common side effects especially with potentially higher drug levels due to being poor metabolizer. She would benefit from stopping bupropion to see if anxiety improves. Since she is on extended-release and low dose, no taper is needed.   - Poor Metabolizer at CYP2C9: medications metabolized through this pathway may result in higher serum levels than expected and may have an increased risk of side effects.  - Intermediate Metabolizer at CYP2D6: medications metabolized through this pathway (Abilify, risperidone, amitriptyline, fluoxetine, paroxetine) may result in slightly higher serum levels of parent drug than expected  - Intermediate Metabolizer at CY: medications metabolized through this pathway (Abilify, risperidone, sertraline)  may result in slightly higher serum levels of parent drug than expected  - Venlafaxine is metabolized by CYP2D6 to active metabolite desvenlafaxine, intermediate metabolizer phenotype may require higher doses to be effective but also be at higher risk of adverse effects due to higher levels of parent  drug. Venlafaxine is also metabolized by CYP2C9 and CY to inactive metabolites.     Recommendations on patient's medication experience and pharmacogenetic profile:  - Stop bupropion, consider NRT or other non-pharmacologic therapy for smoking cessation in the future  - She may benefit from switching venlafaxine to desvenlafaxine to allow for more predictable dose/response and reduce risk for side effects related to medication. Cross taper needed to reduce risk of withdrawal symptoms.    # UTI: Needs improvement. Informed patient Rx for sulfamethoxazole-trimethoprim 800-160 mg 1 tab twice daily for 3 days was sent to pharmacy. Encouraged patient to maintain adequate hydration during antibiotic course.     # Supplements: Stable. No significant drug-supplement interaction identified. Continue to discuss limited evidence of benefits versus cost and pill burden of supplements with patient.    PLAN:                            1. Stop bupropion, no taper needed.   2. Consider switching venlafaxine to desvenlafaxine. Cross-taper recommendations- start desvenlafaxine 50 mg daily and decrease venlafaxine to 37.5 mg x2 weeks, then stop venlafaxine and continue desvenlafaxine   3. Schedule follow-up with provider in 2 weeks  4. Start Bactrim DS prescription ordered by provider    PharmD Follow-up: As needed at patient or provider request    I spent 36 minutes with this patient today. I offer these suggestions for consideration by Chitra Tomlin PA-C. A copy of the visit note was provided to the patient's primary care provider.    The patient declined a summary of these recommendations.     Angelina Borrero, PharmD  Medication Therapy Management Pharmacist  [facility]  Pager: [phone]    ----  Telemedicine Visit Details  Type of service:  Telephone visit  Start Time: 2:04 PM  End Time: 2:40 PM  Originating Location (pt. Location): Home  Distant Location (provider location):  [facility]  MTM  Mode of Communication:  Telephone

## 2020-08-31 VITALS
DIASTOLIC BLOOD PRESSURE: 76 MMHG | WEIGHT: 178.8 LBS | HEART RATE: 80 BPM | HEIGHT: 63 IN | BODY MASS INDEX: 31.68 KG/M2 | SYSTOLIC BLOOD PRESSURE: 144 MMHG

## 2020-08-31 RX ORDER — SUMATRIPTAN 100 MG/1
100 TABLET, FILM COATED ORAL
COMMUNITY

## 2020-08-31 RX ORDER — IBUPROFEN 800 MG/1
800 TABLET, FILM COATED ORAL 3 TIMES DAILY PRN
COMMUNITY

## 2020-08-31 RX ORDER — SULFAMETHOXAZOLE/TRIMETHOPRIM 800-160 MG
1 TABLET ORAL 2 TIMES DAILY
COMMUNITY
Start: 2020-08-26 | End: 2024-05-01

## 2020-08-31 RX ORDER — VENLAFAXINE HYDROCHLORIDE 75 MG/1
75 CAPSULE, EXTENDED RELEASE ORAL DAILY
COMMUNITY
End: 2024-05-01

## 2020-08-31 RX ORDER — BUPROPION HYDROCHLORIDE 150 MG/1
150 TABLET ORAL EVERY MORNING
COMMUNITY
End: 2024-05-01

## 2020-08-31 RX ORDER — MULTIPLE VITAMINS W/ MINERALS TAB 9MG-400MCG
1 TAB ORAL DAILY
COMMUNITY

## 2020-08-31 RX ORDER — ONDANSETRON 4 MG/1
4 TABLET, FILM COATED ORAL EVERY 8 HOURS PRN
COMMUNITY

## 2020-08-31 RX ORDER — GABAPENTIN 300 MG/1
300 CAPSULE ORAL 3 TIMES DAILY
COMMUNITY
End: 2024-05-01

## 2020-08-31 RX ORDER — TRETINOIN 0.25 MG/G
CREAM TOPICAL AT BEDTIME
COMMUNITY
End: 2024-05-12

## 2020-08-31 RX ORDER — DESONIDE 0.5 MG/G
CREAM TOPICAL 2 TIMES DAILY
COMMUNITY
End: 2024-05-12

## 2020-11-22 ENCOUNTER — HEALTH MAINTENANCE LETTER (OUTPATIENT)
Age: 40
End: 2020-11-22

## 2021-06-01 VITALS — WEIGHT: 129 LBS | BODY MASS INDEX: 22.85 KG/M2

## 2021-06-19 NOTE — PROGRESS NOTES
ASSESSMENT AND PLAN:  Zaida Zelaya 37 y.o. female is seen here on 07/02/18 for evaluation of polyarthralgias.  There are some signals here which require careful evaluation to look into the possibility of inflammatory joint disease.  We discussed the differential including psoriatic arthritis in this patient.  Further workup is indicated as noted.  The labs so far which have been done reviewed.  She has responded better to ibuprofen that the Celebrex.  For now this is to be continued.  The question of if she does or does not have psoriasis is critical.  She is asked today to bring her dermatologic records with her.  X-rays of the sacroiliac joints done today, show osteophytes of the inferior margin of both SI joints without definite erosions and no ankylosis.  We will meet here in the next 3-4 weeks.        Diagnoses and all orders for this visit:    Polyarthralgia  -     HM1(CBC and Differential)  -     Creatinine  -     ALT (SGPT)  -     Albumin  -     Rheumatoid Factor Quant  -     CCP Antibodies  -     Hepatitis C Antibody (Anti-HCV)  -     Erythrocyte Sedimentation Rate  -     C-Reactive Protein  -     Anti-Neutrophil Cytoplasmic Antibody, IgG (ANCA IFA)  -     Antinuclear Antibody (GISELLA) Cascade  -     CK Total  -     Hepatitis B Surface Antigen (HBsAG)  -     XR Lumbar Spine 2 or 3 VWS; Future; Expected date: 7/2/18  -     XR Sacroliac Joints 3 Or More VWS; Future; Expected date: 7/2/18  -     XR Sacroliac Joints 3 Or More VWS  -     HM1 (CBC with Diff)    Chronic bilateral low back pain without sciatica    Raynaud's disease without gangrene      HISTORY OF PRESENTING ILLNESS:  Zaida Zelaya, 37 y.o., female is here for evaluation of painful joints.  She reports that this is troubled her for the past 5 years at least if not more.  The most of her symptoms are in her joints in the hands, feet, lower back and also at times knees.  She reports intermittent swelling especially in her right middle finger  PIP area as well as the index and ring sometimes.  She has noted that first thing in the morning she she is not able to bend her fingers beyond almost 1/2 a grace.  It takes about an hour before she is able to make a full fist.  The same happens in the feet and the lower back.  The lower back pain is more recent.  This is not woken her up from sleep but in the morning she is stiff for easily 1 hour.  Many years ago to begin with when she was seen elsewhere a diagnosis of fibromyalgia was made.  She is on tramadol and finds ibuprofen is more helpful than Celebrex.  She tolerated them quite nicely.  Several years ago she was told that she may have Raynaud's.  She describes the color changes nicely.  Recently she had scalp lesions for which she was seen in dermatology.  She is under workup there and the question of if she has psoriasis is being looked into.  She had carpal tunnel release done on both hands in the hope that her symptoms might improve but that has not been the case.  She noted both her paternal grandparents have psoriasis.  As per as she is aware that her dad does not have psoriasis but carries a diagnosis of fibromyalgia.  She takes care of her 7-year-old.  She, she has to 8-year-old.  She used to run a cleaning company.  She does not take alcohol.  She smokes.  She describes otherwise her health is good.     Further historical information and ADL limitations as noted in the multidimensional health assessment questionnaire attached in the EMR. Rest of the 13 system ROS is negative.     ALLERGIES:Review of patient's allergies indicates no known allergies.    PAST MEDICAL/ACTIVE PROBLEMS/MEDICATION/ FAMILY HISTORY/SOCIAL DATA:  The patient has a family history of  Past Medical History:   Diagnosis Date     Facial cellulitis      Fibromyalgia      History of anemia      History of back pain      History of pharyngitis      History of UTI      MRSA cellulitis      Raynaud's phenomenon      History   Smoking  Status     Current Every Day Smoker     Packs/day: 0.50   Smokeless Tobacco     Former User     Patient Active Problem List   Diagnosis     Facial cellulitis     Fibromyalgia     Chronic, continuous use of opioids     Chronic prescription benzodiazepine use     Peripheral neuropathy (H)     Dental decay     Cellulitis     Periorbital cellulitis     Depression, neurotic     Headache, migraine     Raynaud's syndrome     Chronic pain     Anxiety and depression     Methamphetamine abuse     Current Outpatient Prescriptions   Medication Sig Dispense Refill     celecoxib (CELEBREX) 200 MG capsule Take 200 mg by mouth 2 (two) times a day.       cholecalciferol, vitamin D3, 5,000 unit Tab Take by mouth.       dextroamphetamine-amphetamine (ADDERALL XR) 30 MG 24 hr capsule 1 cap(s)       dextroamphetamine-amphetamine 20 mg Tab 1 tab(s)       gabapentin (NEURONTIN) 300 MG capsule Take 300 mg by mouth 2 (two) times a day.       ibuprofen (ADVIL,MOTRIN) 800 MG tablet Take 800 mg by mouth every 6 (six) hours as needed for pain.       LORazepam (ATIVAN) 0.5 MG tablet Take 0.5 mg by mouth daily as needed for anxiety.       multivitamin with minerals (THERA-M) 9 mg iron-400 mcg Tab tablet Take 1 tablet by mouth daily.       ondansetron (ZOFRAN) 4 MG tablet Take 4 mg by mouth every 8 (eight) hours as needed for nausea.       SUMAtriptan (IMITREX) 100 MG tablet Take 100 mg by mouth every 2 (two) hours as needed for migraine. Maximum two doses per day       venlafaxine (EFFEXOR-XR) 150 MG 24 hr capsule Take 150 mg by mouth daily.       desonide (DESOWEN) 0.05 % cream 1 frederic       HYDROcodone-acetaminophen (NORCO )  mg per tablet Take 1 tablet by mouth every 6 (six) hours as needed for pain.       mupirocin (BACTROBAN) 2 % cream 1 frederic       traMADol (ULTRAM) 50 mg tablet Take 50 mg by mouth.       No current facility-administered medications for this visit.        COMPREHENSIVE EXAMINATION:  Vitals:    07/02/18 1611   BP:  110/78   Patient Site: Right Arm   Patient Position: Sitting   Cuff Size: Adult Regular   Pulse: (!) 128   Weight: 129 lb (58.5 kg)     A well appearing alert oriented female. Vital data as noted above. Her eyes without inflammation/scleromalacia. ENTwithout oral mucositis, thrush, nasal deformity, external ear redness, deformity. Her neck is without lymphadenopathy and supple. Lungs normal sounds, no pleural rub. Heart auscultation normal rate, rhythm; no pericardial rub and murmurs. Abdomen soft, non tender, no organomegaly. Skin examined for heliotrope, malar area eruption, lupus pernio, periungual erythema, sclerodactyly, papules, erythema nodosum, purpura, nail pitting, onycholysis, and obvious psoriasis lesion. Neurological examination shows normal alertness, speech, facial symmetry, tone and power in upper and lower extremities, Tinel's and Phalen's at wrist and gait. The joint examination is performed for swelling, tenderness, warmth, erythema, and range of motion in the following joints: DIPs, PIPs, MCPs, wrists, first CMC's, elbows, shoulders, hips, knees, ankles, feet; spine for range of motion and paraspinal muscles for tenderness. The salient normal / abnormal findings are appended.  She has scattered tenderness in the PIPs, first MTPs.  She has tenderness in the proximal upper extremities, she has tenderness in the left trapezius region, she does not have any significant tenderness in the calf or thigh muscles.  She has mild tenderness in the trochanteric area.  She does not have dactylitis at this digits of the toes.  She does not have nail pitting.    LAB / IMAGING DATA:  ALT   Date Value Ref Range Status   04/10/2017 10 0 - 45 U/L Final   03/16/2015 17 0 - 45 U/L Final   02/09/2013 11 <46 IU/L Final     Albumin   Date Value Ref Range Status   04/10/2017 3.8 3.5 - 5.0 g/dL Final   03/16/2015 4.0 3.5 - 5.0 g/dL Final   02/09/2013 3.9 3.5 - 5.0 g/dL Final     Creatinine   Date Value Ref Range Status    04/10/2017 0.67 0.60 - 1.10 mg/dL Final   08/01/2015 0.59 (L) 0.60 - 1.10 mg/dL Final   03/16/2015 0.79 0.60 - 1.10 mg/dL Final       WBC   Date Value Ref Range Status   08/01/2015 8.2 4.0 - 11.0 thou/uL Final   07/31/2015 10.7 4.0 - 11.0 thou/uL Final     Hemoglobin   Date Value Ref Range Status   08/01/2015 10.0 (L) 12.0 - 16.0 g/dL Final   07/31/2015 10.4 (L) 12.0 - 16.0 g/dL Final   02/09/2013 13.3 12.0 - 16.0 g/dL Final     Platelets   Date Value Ref Range Status   08/01/2015 252 140 - 440 thou/uL Final   07/31/2015 251 140 - 440 thou/uL Final   02/09/2013 195 140 - 440 thou/uL Final       Lab Results   Component Value Date    SEDRATE 35 (H) 08/01/2015

## 2021-06-20 NOTE — PROGRESS NOTES
ASSESSMENT AND PLAN:  Zaida Zelaya 38 y.o. female is seen here on 09/05/18 for evaluation of polyarthralgias.  She has diagnoses of psoriasis follows up with dermatology.  1 of the key concerns has been if she has evidence of psoriatic arthritis such as inflammatory joints, dactylitis, enthesitis or others.  So far the clinical as well as workup is reassuring.  We discussed these issues.  She carries a diagnosis of fibromyalgia.  At this juncture there is no indication to start DMARD's or other immunosuppressants.  She remains at risk of developing inflammatory joint disease however.  Will meet here in 6 months unless her joint symptoms or findings change when she returns for follow-up sooner.  In the interim she will continue current management per her primary physician.       Diagnoses and all orders for this visit:    Polyarthralgia    HISTORY OF PRESENTING ILLNESS:  Zaida Zelaya, 38 y.o., female is here for follow-up of recent visit for painful joints.  She reports no new symptoms in the interim.  She has continued to follow-up with her dermatologist.  Where she has been told that she has indeed psoriasis..  She reports that this is troubled her for the past 5 years at least if not more.  The most of her symptoms are in her joints in the hands, feet, lower back and also at times knees.  She reports intermittent swelling especially in her right middle finger PIP area as well as the index and ring sometimes.  She has noted that first thing in the morning she she is not able to bend her fingers beyond almost 1/2 a grace.  It takes about an hour before she is able to make a full fist.  The same happens in the feet and the lower back.  The lower back pain is more recent.  This is not woken her up from sleep but in the morning she is stiff for easily 1 hour.  Many years ago to begin with when she was seen elsewhere a diagnosis of fibromyalgia was made.  She is on tramadol and finds ibuprofen is more helpful  than Celebrex.  She tolerated them quite nicely.  Several years ago she was told that she may have Raynaud's.  She describes the color changes nicely.  Recently she had scalp lesions for which she was seen in dermatology.  She is under workup there and the question of if she has psoriasis is being looked into.  She had carpal tunnel release done on both hands in the hope that her symptoms might improve but that has not been the case.  She noted both her paternal grandparents have psoriasis.  As per as she is aware that her dad does not have psoriasis but carries a diagnosis of fibromyalgia.  She takes care of her 7-year-old.  She, she has to 8-year-old.  She used to run a cleaning company.  She does not take alcohol.  She smokes.  She describes otherwise her health is good.     Further historical information and ADL limitations as noted in the multidimensional health assessment questionnaire attached in the EMR.  ALLERGIES:Review of patient's allergies indicates no known allergies.    PAST MEDICAL/ACTIVE PROBLEMS/MEDICATION/ FAMILY HISTORY/SOCIAL DATA:  The patient has a family history of  Past Medical History:   Diagnosis Date     Facial cellulitis      Fibromyalgia      History of anemia      History of back pain      History of pharyngitis      History of UTI      MRSA cellulitis      Raynaud's phenomenon      History   Smoking Status     Current Every Day Smoker     Packs/day: 0.50   Smokeless Tobacco     Former User     Patient Active Problem List   Diagnosis     Facial cellulitis     Fibromyalgia     Chronic, continuous use of opioids     Chronic prescription benzodiazepine use     Peripheral neuropathy (H)     Dental decay     Cellulitis     Periorbital cellulitis     Depression, neurotic     Headache, migraine     Raynaud's syndrome     Chronic pain     Anxiety and depression     Methamphetamine abuse     Polyarthralgia     Chronic bilateral low back pain without sciatica     Current Outpatient Prescriptions    Medication Sig Dispense Refill     celecoxib (CELEBREX) 200 MG capsule Take 200 mg by mouth 2 (two) times a day.       cholecalciferol, vitamin D3, 5,000 unit Tab Take by mouth.       desonide (DESOWEN) 0.05 % cream 1 frederic       dextroamphetamine-amphetamine (ADDERALL XR) 30 MG 24 hr capsule 1 cap(s)       dextroamphetamine-amphetamine 20 mg Tab 1 tab(s)       gabapentin (NEURONTIN) 300 MG capsule Take 300 mg by mouth 2 (two) times a day.       HYDROcodone-acetaminophen (NORCO )  mg per tablet Take 1 tablet by mouth every 6 (six) hours as needed for pain.       ibuprofen (ADVIL,MOTRIN) 800 MG tablet Take 800 mg by mouth every 6 (six) hours as needed for pain.       LORazepam (ATIVAN) 0.5 MG tablet Take 0.5 mg by mouth daily as needed for anxiety.       multivitamin with minerals (THERA-M) 9 mg iron-400 mcg Tab tablet Take 1 tablet by mouth daily.       mupirocin (BACTROBAN) 2 % cream 1 frederic       ondansetron (ZOFRAN) 4 MG tablet Take 4 mg by mouth every 8 (eight) hours as needed for nausea.       SUMAtriptan (IMITREX) 100 MG tablet Take 100 mg by mouth every 2 (two) hours as needed for migraine. Maximum two doses per day       traMADol (ULTRAM) 50 mg tablet Take 50 mg by mouth.       venlafaxine (EFFEXOR-XR) 150 MG 24 hr capsule Take 150 mg by mouth daily.       No current facility-administered medications for this visit.      DETAILED EXAMINATION  09/05/18  :  Vitals:    09/05/18 1639   BP: 116/62   Patient Site: Right Arm   Patient Position: Sitting   Cuff Size: Adult Regular   Pulse: 92   Weight: 129 lb (58.5 kg)     Alert oriented. Head including the face is examined for malar rash, heliotropes, scarring, lupus pernio. Eyes examined for redness such as in episcleritis/scleritis, periorbital lesions.   Neck/ Face examined for parotid gland swelling, range of motion of neck.  Left upper and lower and right upper and lower extremities examined for tenderness, swelling, warmth of the appendicular joints,  range of motion, edema, rash.  Some of the important findings included: She does not have synovitis in any of the palpable joints of upper and lower extremities.  She has full range of motion of the shoulder joints.  She has tenderness across the trapezius.  There is no dactylitis of the digits of the toes.  She does not have enthesitis such as of the Achilles insertion, around the knees.    LAB / IMAGING DATA:  ALT   Date Value Ref Range Status   07/02/2018 10 0 - 45 U/L Final   04/10/2017 10 0 - 45 U/L Final   03/16/2015 17 0 - 45 U/L Final     Albumin   Date Value Ref Range Status   07/02/2018 4.5 3.5 - 5.0 g/dL Final   04/10/2017 3.8 3.5 - 5.0 g/dL Final   03/16/2015 4.0 3.5 - 5.0 g/dL Final     Creatinine   Date Value Ref Range Status   07/02/2018 0.84 0.60 - 1.10 mg/dL Final   04/10/2017 0.67 0.60 - 1.10 mg/dL Final   08/01/2015 0.59 (L) 0.60 - 1.10 mg/dL Final       WBC   Date Value Ref Range Status   07/02/2018 8.3 4.0 - 11.0 thou/uL Final   08/01/2015 8.2 4.0 - 11.0 thou/uL Final   07/31/2015 10.7 4.0 - 11.0 thou/uL Final     Hemoglobin   Date Value Ref Range Status   07/02/2018 11.0 (L) 12.0 - 16.0 g/dL Final   08/01/2015 10.0 (L) 12.0 - 16.0 g/dL Final   07/31/2015 10.4 (L) 12.0 - 16.0 g/dL Final     Platelets   Date Value Ref Range Status   07/02/2018 329 140 - 440 thou/uL Final   08/01/2015 252 140 - 440 thou/uL Final   07/31/2015 251 140 - 440 thou/uL Final       Lab Results   Component Value Date    RF <15.0 07/02/2018    SEDRATE 14 07/02/2018

## 2021-09-18 ENCOUNTER — HEALTH MAINTENANCE LETTER (OUTPATIENT)
Age: 41
End: 2021-09-18

## 2022-01-08 ENCOUNTER — HEALTH MAINTENANCE LETTER (OUTPATIENT)
Age: 42
End: 2022-01-08

## 2022-11-08 ENCOUNTER — LAB REQUISITION (OUTPATIENT)
Dept: LAB | Facility: CLINIC | Age: 42
End: 2022-11-08

## 2022-11-08 DIAGNOSIS — R68.89 OTHER GENERAL SYMPTOMS AND SIGNS: ICD-10-CM

## 2022-11-08 DIAGNOSIS — Z86.2 PERSONAL HISTORY OF DISEASES OF THE BLOOD AND BLOOD-FORMING ORGANS AND CERTAIN DISORDERS INVOLVING THE IMMUNE MECHANISM: ICD-10-CM

## 2022-11-08 LAB
ALBUMIN SERPL BCG-MCNC: 4.4 G/DL (ref 3.5–5.2)
ALP SERPL-CCNC: 55 U/L (ref 35–104)
ALT SERPL W P-5'-P-CCNC: 16 U/L (ref 10–35)
ANION GAP SERPL CALCULATED.3IONS-SCNC: 13 MMOL/L (ref 7–15)
AST SERPL W P-5'-P-CCNC: 18 U/L (ref 10–35)
BASOPHILS # BLD AUTO: 0.1 10E3/UL (ref 0–0.2)
BASOPHILS NFR BLD AUTO: 1 %
BILIRUB SERPL-MCNC: 0.2 MG/DL
BUN SERPL-MCNC: 10.8 MG/DL (ref 6–20)
CALCIUM SERPL-MCNC: 9.2 MG/DL (ref 8.6–10)
CHLORIDE SERPL-SCNC: 102 MMOL/L (ref 98–107)
CREAT SERPL-MCNC: 0.73 MG/DL (ref 0.51–0.95)
DEPRECATED HCO3 PLAS-SCNC: 25 MMOL/L (ref 22–29)
EOSINOPHIL # BLD AUTO: 0.1 10E3/UL (ref 0–0.7)
EOSINOPHIL NFR BLD AUTO: 2 %
ERYTHROCYTE [DISTWIDTH] IN BLOOD BY AUTOMATED COUNT: 13.1 % (ref 10–15)
GFR SERPL CREATININE-BSD FRML MDRD: >90 ML/MIN/1.73M2
GLUCOSE SERPL-MCNC: 111 MG/DL (ref 70–99)
HCT VFR BLD AUTO: 34.6 % (ref 35–47)
HGB BLD-MCNC: 11.1 G/DL (ref 11.7–15.7)
IMM GRANULOCYTES # BLD: 0 10E3/UL
IMM GRANULOCYTES NFR BLD: 0 %
LYMPHOCYTES # BLD AUTO: 1.9 10E3/UL (ref 0.8–5.3)
LYMPHOCYTES NFR BLD AUTO: 29 %
MCH RBC QN AUTO: 28.5 PG (ref 26.5–33)
MCHC RBC AUTO-ENTMCNC: 32.1 G/DL (ref 31.5–36.5)
MCV RBC AUTO: 89 FL (ref 78–100)
MONOCYTES # BLD AUTO: 0.3 10E3/UL (ref 0–1.3)
MONOCYTES NFR BLD AUTO: 5 %
NEUTROPHILS # BLD AUTO: 4.2 10E3/UL (ref 1.6–8.3)
NEUTROPHILS NFR BLD AUTO: 63 %
NRBC # BLD AUTO: 0 10E3/UL
NRBC BLD AUTO-RTO: 0 /100
PLATELET # BLD AUTO: 294 10E3/UL (ref 150–450)
POTASSIUM SERPL-SCNC: 3.3 MMOL/L (ref 3.4–5.3)
PROT SERPL-MCNC: 6.4 G/DL (ref 6.4–8.3)
RBC # BLD AUTO: 3.89 10E6/UL (ref 3.8–5.2)
SODIUM SERPL-SCNC: 140 MMOL/L (ref 136–145)
TSH SERPL DL<=0.005 MIU/L-ACNC: 1.23 UIU/ML (ref 0.3–4.2)
VIT B12 SERPL-MCNC: 177 PG/ML (ref 232–1245)
WBC # BLD AUTO: 6.6 10E3/UL (ref 4–11)

## 2022-11-08 PROCEDURE — 86376 MICROSOMAL ANTIBODY EACH: CPT | Performed by: PHYSICIAN ASSISTANT

## 2022-11-08 PROCEDURE — 85025 COMPLETE CBC W/AUTO DIFF WBC: CPT | Performed by: PHYSICIAN ASSISTANT

## 2022-11-08 PROCEDURE — 84443 ASSAY THYROID STIM HORMONE: CPT | Performed by: PHYSICIAN ASSISTANT

## 2022-11-08 PROCEDURE — 80053 COMPREHEN METABOLIC PANEL: CPT | Performed by: PHYSICIAN ASSISTANT

## 2022-11-08 PROCEDURE — 82607 VITAMIN B-12: CPT | Performed by: PHYSICIAN ASSISTANT

## 2022-11-09 LAB — THYROPEROXIDASE AB SERPL-ACNC: <10 IU/ML

## 2022-11-14 ENCOUNTER — LAB REQUISITION (OUTPATIENT)
Dept: LAB | Facility: CLINIC | Age: 42
End: 2022-11-14

## 2022-11-14 DIAGNOSIS — R30.0 DYSURIA: ICD-10-CM

## 2022-11-14 PROCEDURE — 87186 SC STD MICRODIL/AGAR DIL: CPT | Performed by: FAMILY MEDICINE

## 2022-11-16 LAB — BACTERIA UR CULT: ABNORMAL

## 2022-11-20 ENCOUNTER — HEALTH MAINTENANCE LETTER (OUTPATIENT)
Age: 42
End: 2022-11-20

## 2022-11-29 ENCOUNTER — LAB REQUISITION (OUTPATIENT)
Dept: LAB | Facility: CLINIC | Age: 42
End: 2022-11-29

## 2022-11-29 ENCOUNTER — TRANSFERRED RECORDS (OUTPATIENT)
Dept: HEALTH INFORMATION MANAGEMENT | Facility: CLINIC | Age: 42
End: 2022-11-29

## 2022-11-29 DIAGNOSIS — Z01.419 ENCOUNTER FOR GYNECOLOGICAL EXAMINATION (GENERAL) (ROUTINE) WITHOUT ABNORMAL FINDINGS: ICD-10-CM

## 2022-11-29 LAB
HPV ABSTRACT: NORMAL
PAP-ABSTRACT: NORMAL

## 2022-11-29 PROCEDURE — G0145 SCR C/V CYTO,THINLAYER,RESCR: HCPCS | Performed by: PHYSICIAN ASSISTANT

## 2022-11-29 PROCEDURE — 87624 HPV HI-RISK TYP POOLED RSLT: CPT | Performed by: PHYSICIAN ASSISTANT

## 2022-12-02 LAB
BKR LAB AP GYN ADEQUACY: NORMAL
BKR LAB AP GYN INTERPRETATION: NORMAL
BKR LAB AP HPV REFLEX: NORMAL
BKR LAB AP LMP: NORMAL
BKR LAB AP PREVIOUS ABNL DX: NORMAL
BKR LAB AP PREVIOUS ABNORMAL: NORMAL
PATH REPORT.COMMENTS IMP SPEC: NORMAL
PATH REPORT.COMMENTS IMP SPEC: NORMAL
PATH REPORT.RELEVANT HX SPEC: NORMAL

## 2022-12-06 LAB
HUMAN PAPILLOMA VIRUS 16 DNA: NEGATIVE
HUMAN PAPILLOMA VIRUS 18 DNA: NEGATIVE
HUMAN PAPILLOMA VIRUS FINAL DIAGNOSIS: NORMAL
HUMAN PAPILLOMA VIRUS OTHER HR: NEGATIVE

## 2023-01-13 ENCOUNTER — LAB REQUISITION (OUTPATIENT)
Dept: LAB | Facility: CLINIC | Age: 43
End: 2023-01-13

## 2023-01-13 DIAGNOSIS — R30.0 DYSURIA: ICD-10-CM

## 2023-01-13 PROCEDURE — 87086 URINE CULTURE/COLONY COUNT: CPT | Performed by: PHYSICIAN ASSISTANT

## 2023-01-15 LAB — BACTERIA UR CULT: NO GROWTH

## 2023-04-20 ENCOUNTER — LAB REQUISITION (OUTPATIENT)
Dept: LAB | Facility: CLINIC | Age: 43
End: 2023-04-20

## 2023-04-20 DIAGNOSIS — E53.8 DEFICIENCY OF OTHER SPECIFIED B GROUP VITAMINS: ICD-10-CM

## 2023-04-20 DIAGNOSIS — Z13.6 ENCOUNTER FOR SCREENING FOR CARDIOVASCULAR DISORDERS: ICD-10-CM

## 2023-04-20 LAB
CHOLEST SERPL-MCNC: 194 MG/DL
HDLC SERPL-MCNC: 63 MG/DL
LDLC SERPL CALC-MCNC: 122 MG/DL
NONHDLC SERPL-MCNC: 131 MG/DL
TRIGL SERPL-MCNC: 43 MG/DL
VIT B12 SERPL-MCNC: 449 PG/ML (ref 232–1245)

## 2023-04-20 PROCEDURE — 82607 VITAMIN B-12: CPT | Performed by: PHYSICIAN ASSISTANT

## 2023-04-20 PROCEDURE — 80061 LIPID PANEL: CPT | Performed by: PHYSICIAN ASSISTANT

## 2023-05-16 ENCOUNTER — TRANSFERRED RECORDS (OUTPATIENT)
Dept: HEALTH INFORMATION MANAGEMENT | Facility: CLINIC | Age: 43
End: 2023-05-16

## 2023-06-08 ENCOUNTER — LAB REQUISITION (OUTPATIENT)
Dept: LAB | Facility: CLINIC | Age: 43
End: 2023-06-08

## 2023-06-08 DIAGNOSIS — R30.0 DYSURIA: ICD-10-CM

## 2023-06-08 PROCEDURE — 87086 URINE CULTURE/COLONY COUNT: CPT | Performed by: PHYSICIAN ASSISTANT

## 2023-06-09 LAB — BACTERIA UR CULT: ABNORMAL

## 2023-12-03 ENCOUNTER — OFFICE VISIT (OUTPATIENT)
Dept: URGENT CARE | Facility: URGENT CARE | Age: 43
End: 2023-12-03
Payer: COMMERCIAL

## 2023-12-03 VITALS
BODY MASS INDEX: 23.9 KG/M2 | OXYGEN SATURATION: 99 % | HEART RATE: 82 BPM | WEIGHT: 136 LBS | TEMPERATURE: 97.4 F | DIASTOLIC BLOOD PRESSURE: 80 MMHG | SYSTOLIC BLOOD PRESSURE: 129 MMHG

## 2023-12-03 DIAGNOSIS — H10.9 CONJUNCTIVITIS OF LEFT EYE, UNSPECIFIED CONJUNCTIVITIS TYPE: Primary | ICD-10-CM

## 2023-12-03 PROCEDURE — 99203 OFFICE O/P NEW LOW 30 MIN: CPT | Performed by: FAMILY MEDICINE

## 2023-12-03 NOTE — PROGRESS NOTES
Assessment & Plan     Conjunctivitis of left eye, unspecified conjunctivitis type  Physical examination consistent with left-sided conjunctivitis.  Patient has been using ofloxacin drops for last 5 days without any significant improvement.  Differentials discussed in detail including viral etiology.  Recommended regular eye wash, warm compresses and to schedule appointment with ophthalmologist tomorrow.  Instructed to go ER if symptoms worsen.  Patient understood and in agreement with above plan.  All questions answered.       Nicotine/Tobacco Cessation:  She reports that she has been smoking cigarettes. She has been smoking an average of 1 pack per day. She has never used smokeless tobacco.  Nicotine/Tobacco Cessation Plan:   Information offered: Patient not interested at this time      Rudolph Sherman MD  [facility]    Ajith Cerda is a 43 year old, presenting for the following health issues:    Eye Problem:  Last weekend her son had pink eye, had it in her right eye and was treating and it went away, then yesterday she could feel it starting to come on in her left eye.  Using ofloxacin eye drops      Review of Systems   Constitutional, HEENT, cardiovascular, pulmonary, gi and gu systems are negative, except as otherwise noted.        Objective    /80   Pulse 82   Temp 97.4  F (36.3  C) (Tympanic)   Wt 61.7 kg (136 lb)   SpO2 99%   BMI 23.90 kg/m    Body mass index is 23.9 kg/m .  Vision: Right eye: 20/60, left 20/40, both 20/32  Physical Exam   GENERAL: healthy, alert, and no distress  EYES: PERRL, EOMI, and conjunctiva/corneas- conjunctival injection and clear colored discharge present left  NECK: no adenopathy, no asymmetry, masses, or scars and thyroid normal to palpation  RESP: lungs clear to auscultation - no rales, rhonchi or wheezes  CV: regular rates and rhythm, normal S1 S2, no S3 or S4, and no murmur, click or rub  MS: no gross musculoskeletal  defects noted, no edema  NEURO: Normal strength and tone, mentation intact and speech normal  PSYCH: mentation appears normal, affect normal/bright

## 2024-02-03 ENCOUNTER — HEALTH MAINTENANCE LETTER (OUTPATIENT)
Age: 44
End: 2024-02-03

## 2024-03-12 ENCOUNTER — TRANSFERRED RECORDS (OUTPATIENT)
Dept: HEALTH INFORMATION MANAGEMENT | Facility: CLINIC | Age: 44
End: 2024-03-12

## 2024-03-12 ENCOUNTER — LAB REQUISITION (OUTPATIENT)
Dept: LAB | Facility: CLINIC | Age: 44
End: 2024-03-12

## 2024-03-12 DIAGNOSIS — R30.0 DYSURIA: ICD-10-CM

## 2024-03-12 PROCEDURE — 87186 SC STD MICRODIL/AGAR DIL: CPT | Performed by: PHYSICIAN ASSISTANT

## 2024-03-12 PROCEDURE — 87086 URINE CULTURE/COLONY COUNT: CPT | Performed by: PHYSICIAN ASSISTANT

## 2024-03-13 LAB — BACTERIA UR CULT: ABNORMAL

## 2024-04-01 ENCOUNTER — OFFICE VISIT (OUTPATIENT)
Dept: FAMILY MEDICINE | Facility: CLINIC | Age: 44
End: 2024-04-01
Payer: COMMERCIAL

## 2024-04-01 VITALS
RESPIRATION RATE: 16 BRPM | TEMPERATURE: 98 F | HEART RATE: 90 BPM | SYSTOLIC BLOOD PRESSURE: 110 MMHG | HEIGHT: 63 IN | DIASTOLIC BLOOD PRESSURE: 70 MMHG | BODY MASS INDEX: 23.57 KG/M2 | OXYGEN SATURATION: 100 % | WEIGHT: 133 LBS

## 2024-04-01 DIAGNOSIS — Z76.89 ENCOUNTER TO ESTABLISH CARE: Primary | ICD-10-CM

## 2024-04-01 DIAGNOSIS — F33.9 RECURRENT MAJOR DEPRESSIVE DISORDER, REMISSION STATUS UNSPECIFIED (H): ICD-10-CM

## 2024-04-01 PROCEDURE — 96127 BRIEF EMOTIONAL/BEHAV ASSMT: CPT | Performed by: FAMILY MEDICINE

## 2024-04-01 PROCEDURE — 99213 OFFICE O/P EST LOW 20 MIN: CPT | Performed by: FAMILY MEDICINE

## 2024-04-01 RX ORDER — DESVENLAFAXINE 50 MG/1
50 TABLET, FILM COATED, EXTENDED RELEASE ORAL DAILY
COMMUNITY
Start: 2024-03-25

## 2024-04-01 RX ORDER — ESCITALOPRAM OXALATE 10 MG/1
10 TABLET ORAL DAILY
COMMUNITY
Start: 2022-06-21

## 2024-04-01 ASSESSMENT — PATIENT HEALTH QUESTIONNAIRE - PHQ9
SUM OF ALL RESPONSES TO PHQ QUESTIONS 1-9: 4
10. IF YOU CHECKED OFF ANY PROBLEMS, HOW DIFFICULT HAVE THESE PROBLEMS MADE IT FOR YOU TO DO YOUR WORK, TAKE CARE OF THINGS AT HOME, OR GET ALONG WITH OTHER PEOPLE: SOMEWHAT DIFFICULT
SUM OF ALL RESPONSES TO PHQ QUESTIONS 1-9: 4

## 2024-04-01 ASSESSMENT — PAIN SCALES - GENERAL: PAINLEVEL: NO PAIN (0)

## 2024-04-01 NOTE — NURSING NOTE
"Chief Complaint   Patient presents with    Consult       Initial /70   Pulse 90   Temp 98  F (36.7  C) (Tympanic)   Resp 16   Ht 1.607 m (5' 3.25\")   Wt 60.3 kg (133 lb)   LMP 03/26/2024 (Approximate)   SpO2 100%   BMI 23.37 kg/m   Estimated body mass index is 23.37 kg/m  as calculated from the following:    Height as of this encounter: 1.607 m (5' 3.25\").    Weight as of this encounter: 60.3 kg (133 lb).    Patient presents to the clinic using No DME    Is there anyone who you would like to be able to receive your results? No  If yes have patient fill out MAXINE      "

## 2024-04-01 NOTE — PROGRESS NOTES
"  Assessment & Plan     Encounter to establish care  Patient presents to establish care  Release of information papers given for [facility], vascular clinic and previous primary care clinic  Patient states she is currently on vitamin B12 shots  Endorses there is concern for rheumatological condition due to pain in multiple joint  She feels well today    Recurrent major depressive disorder, remission status unspecified (H24)  On desvenlafaxine                  Ajith Cerda is a 43 year old, presenting for the following health issues:  Consult    History of Present Illness       Reason for visit:  Multiple symptoms new diagnosis discuss treamtment plan  Symptom onset:  More than a month  Symptom intensity:  Moderate  Symptom progression:  Worsening  Had these symptoms before:  Yes  Has tried/received treatment for these symptoms:  Yes  Previous treatment was successful:  No  What makes it worse:  Reoccurring flairs of multi changing developing problems  What makes it better:  Somewhat for littlte roya if any    She eats 2-3 servings of fruits and vegetables daily.She consumes 1 sweetened beverage(s) daily.She exercises with enough effort to increase her heart rate 9 or less minutes per day.  She exercises with enough effort to increase her heart rate 7 days per week.   She is taking medications regularly.    Symptoms: hands peel, \"started with feet now down to boob\", muscle pain, \"lately my thoughts are different\", stopped using pain medications,     \"Has compressed vein in thigh\" follows with vascular    Follows with [facility]    Sees [facility] in [place]  Sees Chitra Tomlin    Did genesight testing - now on desvenlafaxine    Has been vitamin b12 shots  Also endorses iron deficiency                  Objective    /70   Pulse 90   Temp 98  F (36.7  C) (Tympanic)   Resp 16   Ht 1.607 m (5' 3.25\")   Wt 60.3 kg (133 lb)   LMP 03/26/2024 (Approximate)   SpO2 100%   BMI 23.37 kg/m    Body mass index is " 23.37 kg/m .  Physical Exam  Vitals reviewed.   Constitutional:       General: She is not in acute distress.     Appearance: She is not ill-appearing.   Cardiovascular:      Rate and Rhythm: Normal rate.   Pulmonary:      Effort: Pulmonary effort is normal.   Neurological:      Mental Status: She is alert.   Psychiatric:         Mood and Affect: Mood normal.         Behavior: Behavior normal.         Thought Content: Thought content normal.         Judgment: Judgment normal.                    Signed Electronically by: Christine Albright MD

## 2024-05-01 ENCOUNTER — OFFICE VISIT (OUTPATIENT)
Dept: FAMILY MEDICINE | Facility: CLINIC | Age: 44
End: 2024-05-01
Payer: COMMERCIAL

## 2024-05-01 VITALS
BODY MASS INDEX: 24.27 KG/M2 | RESPIRATION RATE: 16 BRPM | SYSTOLIC BLOOD PRESSURE: 120 MMHG | OXYGEN SATURATION: 100 % | HEART RATE: 80 BPM | TEMPERATURE: 97.9 F | DIASTOLIC BLOOD PRESSURE: 82 MMHG | WEIGHT: 137 LBS | HEIGHT: 63 IN

## 2024-05-01 DIAGNOSIS — I83.893 VARICOSE VEINS OF BOTH LEGS WITH EDEMA: Primary | ICD-10-CM

## 2024-05-01 DIAGNOSIS — L40.50 PSORIASIS WITH ARTHROPATHY (H): ICD-10-CM

## 2024-05-01 DIAGNOSIS — M25.50 MULTIPLE JOINT PAIN: ICD-10-CM

## 2024-05-01 LAB
ALBUMIN SERPL BCG-MCNC: 3.9 G/DL (ref 3.5–5.2)
ALP SERPL-CCNC: 57 U/L (ref 40–150)
ALT SERPL W P-5'-P-CCNC: 18 U/L (ref 0–50)
ANION GAP SERPL CALCULATED.3IONS-SCNC: 6 MMOL/L (ref 7–15)
AST SERPL W P-5'-P-CCNC: 18 U/L (ref 0–45)
BILIRUB SERPL-MCNC: 0.2 MG/DL
BUN SERPL-MCNC: 12.3 MG/DL (ref 6–20)
CALCIUM SERPL-MCNC: 8.5 MG/DL (ref 8.6–10)
CHLORIDE SERPL-SCNC: 107 MMOL/L (ref 98–107)
CREAT SERPL-MCNC: 0.74 MG/DL (ref 0.51–0.95)
CRP SERPL-MCNC: <3 MG/L
DEPRECATED HCO3 PLAS-SCNC: 27 MMOL/L (ref 22–29)
EGFRCR SERPLBLD CKD-EPI 2021: >90 ML/MIN/1.73M2
ERYTHROCYTE [DISTWIDTH] IN BLOOD BY AUTOMATED COUNT: 13.1 % (ref 10–15)
ERYTHROCYTE [SEDIMENTATION RATE] IN BLOOD BY WESTERGREN METHOD: 8 MM/HR (ref 0–20)
GLUCOSE SERPL-MCNC: 90 MG/DL (ref 70–99)
HCT VFR BLD AUTO: 35.5 % (ref 35–47)
HGB BLD-MCNC: 11.1 G/DL (ref 11.7–15.7)
MCH RBC QN AUTO: 28.5 PG (ref 26.5–33)
MCHC RBC AUTO-ENTMCNC: 31.3 G/DL (ref 31.5–36.5)
MCV RBC AUTO: 91 FL (ref 78–100)
PLATELET # BLD AUTO: 244 10E3/UL (ref 150–450)
POTASSIUM SERPL-SCNC: 4.2 MMOL/L (ref 3.4–5.3)
PROT SERPL-MCNC: 6.1 G/DL (ref 6.4–8.3)
RBC # BLD AUTO: 3.9 10E6/UL (ref 3.8–5.2)
SODIUM SERPL-SCNC: 140 MMOL/L (ref 135–145)
WBC # BLD AUTO: 4.7 10E3/UL (ref 4–11)

## 2024-05-01 PROCEDURE — 82306 VITAMIN D 25 HYDROXY: CPT | Performed by: NURSE PRACTITIONER

## 2024-05-01 PROCEDURE — 85027 COMPLETE CBC AUTOMATED: CPT | Performed by: NURSE PRACTITIONER

## 2024-05-01 PROCEDURE — 86200 CCP ANTIBODY: CPT | Performed by: NURSE PRACTITIONER

## 2024-05-01 PROCEDURE — 99214 OFFICE O/P EST MOD 30 MIN: CPT | Performed by: NURSE PRACTITIONER

## 2024-05-01 PROCEDURE — 86431 RHEUMATOID FACTOR QUANT: CPT | Performed by: NURSE PRACTITIONER

## 2024-05-01 PROCEDURE — 80053 COMPREHEN METABOLIC PANEL: CPT | Performed by: NURSE PRACTITIONER

## 2024-05-01 PROCEDURE — 86038 ANTINUCLEAR ANTIBODIES: CPT | Performed by: NURSE PRACTITIONER

## 2024-05-01 PROCEDURE — 86140 C-REACTIVE PROTEIN: CPT | Performed by: NURSE PRACTITIONER

## 2024-05-01 PROCEDURE — 85652 RBC SED RATE AUTOMATED: CPT | Performed by: NURSE PRACTITIONER

## 2024-05-01 PROCEDURE — 36415 COLL VENOUS BLD VENIPUNCTURE: CPT | Performed by: NURSE PRACTITIONER

## 2024-05-01 RX ORDER — UBIDECARENONE 75 MG
CAPSULE ORAL
COMMUNITY
Start: 2024-04-26

## 2024-05-01 ASSESSMENT — PAIN SCALES - GENERAL: PAINLEVEL: MILD PAIN (3)

## 2024-05-01 NOTE — PROGRESS NOTES
Assessment & Plan     Varicose veins of both legs with edema  Vascular referral for painful varicose veins.  Compression stockings recommended.  - Vascular Surgery Referral; Future    Psoriasis with arthropathy (H)  History of psoriasis with multiple joint pain, dermatology referral placed for additional evaluation.  - Adult Dermatology  Referral; Future    Multiple joint pain  Labs pending for further evaluation which so far are unremarkable for an inflammatory cause of pain.   - CRP, inflammation; Future  - ESR: Erythrocyte sedimentation rate; Future  - Cyclic Citrullinated Peptide Antibody IgG; Future  - Rheumatoid factor; Future  - Anti Nuclear Vivien IgG by IFA with Reflex; Future  - Vitamin D Deficiency; Future  - CBC with platelets; Future  - Comprehensive metabolic panel (BMP + Alb, Alk Phos, ALT, AST, Total. Bili, TP); Future  - CRP, inflammation  - ESR: Erythrocyte sedimentation rate  - Cyclic Citrullinated Peptide Antibody IgG  - Rheumatoid factor  - Anti Nuclear Vivien IgG by IFA with Reflex  - Vitamin D Deficiency  - CBC with platelets  - Comprehensive metabolic panel (BMP + Alb, Alk Phos, ALT, AST, Total. Bili, TP)      Ajith Cerda is a 43 year old, presenting for the following health issues:  Swelling        5/1/2024     1:58 PM   Additional Questions   Roomed by SHAWNA Cid     History of Present Illness       Reason for visit:  Feet and hand swelling    She eats 2-3 servings of fruits and vegetables daily.She consumes 2 sweetened beverage(s) daily.She exercises with enough effort to increase her heart rate 10 to 19 minutes per day.  She exercises with enough effort to increase her heart rate 3 or less days per week.   She is taking medications regularly.     Concern - Swelling   Onset: Worse x 1 day   Description: right ankle and hand   Intensity: moderate  Progression of Symptoms:  worsening  Accompanying Signs & Symptoms: throbbing pain in ankle   Previous history of similar problem:  "yes, states had seen vascular in the past  Therapies tried and outcome:  none     Elbow, wrist, ankle, knee pain  Ankles, hands, and knees will swell   Chronic joint pain  History of psoriasis  Grandma with RA  No tick bites known      Review of Systems  Constitutional, HEENT, cardiovascular, pulmonary, gi and gu systems are negative, except as otherwise noted.      Objective    /82 (Cuff Size: Adult Regular)   Pulse 80   Temp 97.9  F (36.6  C) (Tympanic)   Resp 16   Ht 1.607 m (5' 3.25\")   Wt 62.1 kg (137 lb)   LMP 03/26/2024 (Approximate)   SpO2 100%   BMI 24.08 kg/m    Body mass index is 24.08 kg/m .  Physical Exam   GENERAL: alert and no distress  MS: no gross musculoskeletal defects noted,  mild swelling bilateral hands  PSYCH: mentation appears normal, affect normal/bright    Results for orders placed or performed in visit on 05/01/24   CRP, inflammation     Status: Normal   Result Value Ref Range    CRP Inflammation <3.00 <5.00 mg/L   ESR: Erythrocyte sedimentation rate     Status: Normal   Result Value Ref Range    Erythrocyte Sedimentation Rate 8 0 - 20 mm/hr   Rheumatoid factor     Status: Normal   Result Value Ref Range    Rheumatoid Factor <10 <14 IU/mL   Anti Nuclear Vivien IgG by IFA with Reflex     Status: Normal   Result Value Ref Range    GISELLA interpretation Negative Negative   Vitamin D Deficiency     Status: Normal   Result Value Ref Range    Vitamin D, Total (25-Hydroxy) 24 20 - 50 ng/mL    Narrative    Season, race, dietary intake, and treatment affect the concentration of 25-hydroxy-Vitamin D. Values may decrease during winter months and increase during summer months.    Vitamin D determination is routinely performed by an immunoassay specific for 25 hydroxyvitamin D3.  If an individual is on vitamin D2(ergocalciferol) supplementation, please specify 25 OH vitamin D2 and D3 level determination by LCMSMS test VITD23.     CBC with platelets     Status: Abnormal   Result Value Ref Range "    WBC Count 4.7 4.0 - 11.0 10e3/uL    RBC Count 3.90 3.80 - 5.20 10e6/uL    Hemoglobin 11.1 (L) 11.7 - 15.7 g/dL    Hematocrit 35.5 35.0 - 47.0 %    MCV 91 78 - 100 fL    MCH 28.5 26.5 - 33.0 pg    MCHC 31.3 (L) 31.5 - 36.5 g/dL    RDW 13.1 10.0 - 15.0 %    Platelet Count 244 150 - 450 10e3/uL   Comprehensive metabolic panel (BMP + Alb, Alk Phos, ALT, AST, Total. Bili, TP)     Status: Abnormal   Result Value Ref Range    Sodium 140 135 - 145 mmol/L    Potassium 4.2 3.4 - 5.3 mmol/L    Carbon Dioxide (CO2) 27 22 - 29 mmol/L    Anion Gap 6 (L) 7 - 15 mmol/L    Urea Nitrogen 12.3 6.0 - 20.0 mg/dL    Creatinine 0.74 0.51 - 0.95 mg/dL    GFR Estimate >90 >60 mL/min/1.73m2    Calcium 8.5 (L) 8.6 - 10.0 mg/dL    Chloride 107 98 - 107 mmol/L    Glucose 90 70 - 99 mg/dL    Alkaline Phosphatase 57 40 - 150 U/L    AST 18 0 - 45 U/L    ALT 18 0 - 50 U/L    Protein Total 6.1 (L) 6.4 - 8.3 g/dL    Albumin 3.9 3.5 - 5.2 g/dL    Bilirubin Total 0.2 <=1.2 mg/dL           Signed Electronically by: AMANDA Watt CNP

## 2024-05-02 LAB — ANA SER QL IF: NEGATIVE

## 2024-05-03 LAB
RHEUMATOID FACT SERPL-ACNC: <10 IU/ML
VIT D+METAB SERPL-MCNC: 24 NG/ML (ref 20–50)

## 2024-05-04 LAB — CCP AB SER IA-ACNC: 0.6 U/ML

## 2024-05-12 ENCOUNTER — MYC REFILL (OUTPATIENT)
Dept: FAMILY MEDICINE | Facility: CLINIC | Age: 44
End: 2024-05-12
Payer: COMMERCIAL

## 2024-05-12 DIAGNOSIS — L40.50 PSORIASIS WITH ARTHROPATHY (H): Primary | ICD-10-CM

## 2024-05-12 DIAGNOSIS — L70.9 ACNE, UNSPECIFIED ACNE TYPE: ICD-10-CM

## 2024-05-13 ENCOUNTER — DOCUMENTATION ONLY (OUTPATIENT)
Dept: FAMILY MEDICINE | Facility: CLINIC | Age: 44
End: 2024-05-13
Payer: COMMERCIAL

## 2024-05-13 DIAGNOSIS — Z86.39 HISTORY OF IRON DEFICIENCY: ICD-10-CM

## 2024-05-13 DIAGNOSIS — E53.8 B12 DEFICIENCY: Primary | ICD-10-CM

## 2024-05-13 DIAGNOSIS — Z13.6 CARDIOVASCULAR SCREENING; LDL GOAL LESS THAN 160: ICD-10-CM

## 2024-05-13 RX ORDER — DESONIDE 0.5 MG/G
CREAM TOPICAL 2 TIMES DAILY
Qty: 60 G | Refills: 0 | Status: SHIPPED | OUTPATIENT
Start: 2024-05-13

## 2024-05-13 RX ORDER — TRETINOIN 0.25 MG/G
CREAM TOPICAL AT BEDTIME
Qty: 45 G | Refills: 0 | Status: SHIPPED | OUTPATIENT
Start: 2024-05-13

## 2024-05-13 NOTE — PROGRESS NOTES
Zaida Zelaya has an upcoming lab appointment:    Future Appointments   Date Time Provider Department Center   5/14/2024 11:30 AM [facility]   10/29/2024  8:15 AM Cris Baires PA-C [facility]     Patient is scheduled for the following lab(s): overdue lipid, vitamin B injections    There is no order available. Please review and place either future orders or HMPO (Review of Health Maintenance Protocol Orders), as appropriate.    Health Maintenance Due   Topic    ANNUAL REVIEW OF HM ORDERS     HIV SCREENING     LIPID      Mariola Morrison

## 2024-05-13 NOTE — PROGRESS NOTES
Saw you 4/1/24:    Has been vitamin b12 shots  Also endorses iron deficiency    Do you want any other labs done?

## 2024-05-16 ENCOUNTER — MYC MEDICAL ADVICE (OUTPATIENT)
Dept: FAMILY MEDICINE | Facility: CLINIC | Age: 44
End: 2024-05-16
Payer: COMMERCIAL

## 2024-05-16 DIAGNOSIS — I83.893 VARICOSE VEINS OF BOTH LEGS WITH EDEMA: Primary | ICD-10-CM

## 2024-05-16 NOTE — TELEPHONE ENCOUNTER
Pls see [employer] message below.     Referral pended and message routed to provider for consideration.     Julie Behrendt RN

## 2024-06-06 ENCOUNTER — OFFICE VISIT (OUTPATIENT)
Dept: URGENT CARE | Facility: URGENT CARE | Age: 44
End: 2024-06-06
Payer: COMMERCIAL

## 2024-06-06 VITALS
BODY MASS INDEX: 23.73 KG/M2 | DIASTOLIC BLOOD PRESSURE: 81 MMHG | RESPIRATION RATE: 16 BRPM | SYSTOLIC BLOOD PRESSURE: 120 MMHG | TEMPERATURE: 97.1 F | WEIGHT: 135 LBS | HEART RATE: 82 BPM | OXYGEN SATURATION: 100 %

## 2024-06-06 DIAGNOSIS — N39.0 ACUTE UTI: Primary | ICD-10-CM

## 2024-06-06 LAB
ALBUMIN UR-MCNC: NEGATIVE MG/DL
APPEARANCE UR: CLEAR
BACTERIA #/AREA URNS HPF: ABNORMAL /HPF
BILIRUB UR QL STRIP: NEGATIVE
CLUE CELLS: ABNORMAL
COLOR UR AUTO: YELLOW
GLUCOSE UR STRIP-MCNC: NEGATIVE MG/DL
HGB UR QL STRIP: ABNORMAL
KETONES UR STRIP-MCNC: NEGATIVE MG/DL
LEUKOCYTE ESTERASE UR QL STRIP: ABNORMAL
NITRATE UR QL: POSITIVE
PH UR STRIP: 5.5 [PH] (ref 5–7)
RBC #/AREA URNS AUTO: ABNORMAL /HPF
SP GR UR STRIP: >=1.03 (ref 1–1.03)
SQUAMOUS #/AREA URNS AUTO: ABNORMAL /LPF
TRICHOMONAS, WET PREP: ABNORMAL
UROBILINOGEN UR STRIP-ACNC: 0.2 E.U./DL
WBC #/AREA URNS AUTO: ABNORMAL /HPF
WBC CLUMPS #/AREA URNS HPF: PRESENT /HPF
WBC'S/HIGH POWER FIELD, WET PREP: ABNORMAL
YEAST, WET PREP: ABNORMAL

## 2024-06-06 PROCEDURE — 87186 SC STD MICRODIL/AGAR DIL: CPT

## 2024-06-06 PROCEDURE — 81001 URINALYSIS AUTO W/SCOPE: CPT

## 2024-06-06 PROCEDURE — 87086 URINE CULTURE/COLONY COUNT: CPT

## 2024-06-06 PROCEDURE — 99214 OFFICE O/P EST MOD 30 MIN: CPT

## 2024-06-06 PROCEDURE — 87210 SMEAR WET MOUNT SALINE/INK: CPT

## 2024-06-06 RX ORDER — NITROFURANTOIN 25; 75 MG/1; MG/1
100 CAPSULE ORAL 2 TIMES DAILY
Qty: 10 CAPSULE | Refills: 0 | Status: SHIPPED | OUTPATIENT
Start: 2024-06-06 | End: 2024-06-11

## 2024-06-06 NOTE — PATIENT INSTRUCTIONS
Diagnosis: UTI (urinary tract infection)     Plan:   Lab results today were positive for urinary tract infection.   Urine culture will becompleted and you  will only receive a phone call if antibiotic treatment needs to be adjusted  Start antibiotics today, take full course   Monitor GI distress  Consider a probiotic, kefir, or yogurt with live active cultures to replace good bacteria in the gastrointestinal tract.  Increase fluids   Can try Aso or phridum to help reduce pain   Pyridium will turn your urine orange and may stain your clothing.  Other   Avoid items that can irritate the bladder: caffeine, alcohol, spicy food, nicotine, carbonated drinks, and artificial sweeteners  Empty bladder frequently even if small amounts, helps to remove bacteria        Monitor for:   New Fevers  Symptoms are not getting better   Pain in the belly (abdomen) area below the bellybutton,  Low back back pain, on the sides, below the ribs- flanks   Nausea or vomiting  Unable to control bladder   Feeling confused, lightheaded or dizzy         UTI urinary tract infection, bladder infection   A bladder infection, also called cystitis, or urinary tract infection   Is where the inner lining of the bladder becomes inflamed (red and swollen) and the urine is full of bacteria.   It happens when bacteria travel up the urethra and into the bladder, usually from stool contact   Women are more likely to have bladder infections than men because their urethra is shorter.   The short urethra makes it easier for bacteria from the anus or genital area to reach the bladder.   This can happen during such activities as wiping or sexual intercourse. Most infections of the urinary tract are caused this way.   Bladder infections often occur in young women who have just become sexually active and have sexual intercourse often.   Symptoms: a frequent and urgent need to urinate, a burning, stinging, or pressure sensation during urination   a crampy pain or  discomfort in the lower abdomen just above the pubic bone or sometimes in the lower back   a need to urinate more often in the night, cloudy urine that smells bad, blood in the urine, leaking of urine, fever and occasionally chills.   Prevent  To help prevent a bladder infection from recurring:  -urinate often during the day, and empty your bladder completely each time.   In addition women should follow these guidelines:   - Keep the vaginal area clean.   - Wipe from front to back after a bowel movement.   - Be sure to wash the genital area each time you bathe or shower.   - However, use soap only on the outside of your vagina.   - The chemicals in soap may cause additional irritation.   - Urinate after intercourse. Never combine anal and vaginal intercourse.   - Wear cotton underwear, which allows better air circulation than nylon.    - Avoid tight clothes in the genital area, such as control-top pantyhose and tight jeans.   - Do not wear a wet bathing suit for long periods of time.

## 2024-06-06 NOTE — PROGRESS NOTES
URGENT CARE  Assessment & Plan   Assessment:   Zaida Zelaya is a 43 year old female who's clinical presentation today is consistent with:   1. Acute UTI - recurrent   - UA Macroscopic with reflex to Microscopic and Culture - Clinic Collect  - Wet prep - Clinic Collect  - nitroFURantoin macrocrystal-monohydrate (MACROBID) 100 MG capsule;   Plan:  Will treat patient's recurrent cystitis, with antibiotics at this time, culture pending and will call if a different antibiotic is needed, no concerns for systemic illness or pyelonephritis at this time, but will monitor given mild back pain. Afebrile and symptom duration is two days   additionally encouraged patient to increase fluid intake, practice good hygiene (wiping front to back, voiding after sexual intercourse), and avoidance of deodorant sprays.   Additionally we discussed if symptoms do not improve after starting today's treatment to follow up in 5-7 days, sooner if symptoms worsen, return precautions given    No alarm signs or symptoms present   Differential Diagnoses for this patient's chief complaint that I considered include:  Bacterial vaginosis, candidiasis, Vulvovaginitis, atrophic vaginitis, Overactive bladder, urethritis, interstitial cystitis, urethral irritation,  Pyelonephritis, nephrolithiasis, Pelvic organ (uterine/bladder) prolapse, Foreign body in bladder,  Atypical etiology of cystitis: fungal, viral, contact vs allergic vaginitis,  Malignancy (vaginal, ovarian, cervical)     Patient is} agreeable to treatment plan and state they will follow-up if symptoms do not improve and/or if symptoms worsen   see patient's AVS 'monitor for' section for specific patient instructions given and discussed regarding what to watch for and when to follow up    AMANDA Pickard [facility] [place]      ______________________________________________________________________      Subjective     HPI: Zaida Zelaya  is a 43 year old   female who presents today for evaluation the following concerns:   The patient presents today complaining of dysuria and urinary frequency for 2 days    Patient denies} blood in urine, burning, pelvic pain  Patient endorses having a past history of frequent urinary tract infections, her last one was 3 months ago   Patient states she has mild bilateral back pain, but denies any fever, chills, or any abnormal vaginal discharge/odor or bleeding.    Review of Systems:  Pertinent review of systems as reflected in HPI, otherwise negative.     Objective    Physical Exam:  Vitals:    06/06/24 1628   BP: 120/81   Pulse: 82   Resp: 16   Temp: 97.1  F (36.2  C)   TempSrc: Tympanic   SpO2: 100%   Weight: 61.2 kg (135 lb)      General: Alert and oriented, no acute distress, afebrile, normotensive  Psy/mental status: Nonanxious, cooperative  SKIN: Intact, no open areas  ABDOMEN:  soft, non-tender, non-distended    No flank pain or CVA tenderness to palpation   Pelvic/ :   Deferred    LABS:   Results for orders placed or performed in visit on 06/06/24   UA Macroscopic with reflex to Microscopic and Culture - Clinic Collect     Status: Abnormal    Specimen: Urine, Clean Catch   Result Value Ref Range    Color Urine Yellow Colorless, Straw, Light Yellow, Yellow    Appearance Urine Clear Clear    Glucose Urine Negative Negative mg/dL    Bilirubin Urine Negative Negative    Ketones Urine Negative Negative mg/dL    Specific Gravity Urine >=1.030 1.003 - 1.035    Blood Urine Trace (A) Negative    pH Urine 5.5 5.0 - 7.0    Protein Albumin Urine Negative Negative mg/dL    Urobilinogen Urine 0.2 0.2, 1.0 E.U./dL    Nitrite Urine Positive (A) Negative    Leukocyte Esterase Urine Small (A) Negative   Urine Microscopic Exam     Status: Abnormal   Result Value Ref Range    Bacteria Urine Many (A) None Seen /HPF    RBC Urine 2-5 (A) 0-2 /HPF /HPF    WBC Urine 25-50 (A) 0-5 /HPF /HPF    Squamous Epithelials Urine Few (A) None Seen /LPF    WBC  Clumps Urine Present (A) None Seen /HPF   Wet prep - Clinic Collect     Status: Abnormal    Specimen: Vagina; Swab   Result Value Ref Range    Trichomonas Absent Absent    Yeast Absent Absent    Clue Cells Absent Absent    WBCs/high power field 1+ (A) None      ______________________________________________________________________    I explained my diagnostic considerations and recommendations to the patient, who voiced understanding and agreement with the treatment plan.   All questions were answered.   We discussed potential side effects, risks and benefits of any prescribed or recommended therapies, as well as expectations for response to treatments.  Please see AVS for any patient instructions & handouts given.   Patient was advised to contact the Nurse Care Line, their Primary Care provider, Urgent Care, or the Emergency Department if there are new or worsening symptoms, or call 911 for emergencies.

## 2024-06-07 ENCOUNTER — DOCUMENTATION ONLY (OUTPATIENT)
Dept: FAMILY MEDICINE | Facility: CLINIC | Age: 44
End: 2024-06-07
Payer: COMMERCIAL

## 2024-06-07 NOTE — PROGRESS NOTES
Left message for patient regarding no orders for stool samples and needs to call if questions. Erika Orr MA

## 2024-06-07 NOTE — PROGRESS NOTES
Patient is coming for labs, appointment note states she's coming for blood and stool samples, there are no orders for stool samples.  Please place future orders or contact patient ASAP.  Thanks!

## 2024-06-08 LAB
BACTERIA UR CULT: ABNORMAL
BACTERIA UR CULT: ABNORMAL

## 2024-06-13 ENCOUNTER — OFFICE VISIT (OUTPATIENT)
Dept: URGENT CARE | Facility: URGENT CARE | Age: 44
End: 2024-06-13
Payer: COMMERCIAL

## 2024-06-13 ENCOUNTER — MYC MEDICAL ADVICE (OUTPATIENT)
Dept: FAMILY MEDICINE | Facility: CLINIC | Age: 44
End: 2024-06-13

## 2024-06-13 VITALS
TEMPERATURE: 97.8 F | OXYGEN SATURATION: 100 % | BODY MASS INDEX: 23.55 KG/M2 | SYSTOLIC BLOOD PRESSURE: 119 MMHG | WEIGHT: 134 LBS | DIASTOLIC BLOOD PRESSURE: 72 MMHG | RESPIRATION RATE: 16 BRPM | HEART RATE: 70 BPM

## 2024-06-13 DIAGNOSIS — N39.0 URINARY TRACT INFECTION WITHOUT HEMATURIA, SITE UNSPECIFIED: Primary | ICD-10-CM

## 2024-06-13 DIAGNOSIS — N30.01 ACUTE CYSTITIS WITH HEMATURIA: Primary | ICD-10-CM

## 2024-06-13 LAB
ALBUMIN UR-MCNC: 30 MG/DL
APPEARANCE UR: CLEAR
BACTERIA #/AREA URNS HPF: ABNORMAL /HPF
BILIRUB UR QL STRIP: NEGATIVE
CLUE CELLS: ABNORMAL
COLOR UR AUTO: YELLOW
GLUCOSE UR STRIP-MCNC: NEGATIVE MG/DL
HGB UR QL STRIP: ABNORMAL
KETONES UR STRIP-MCNC: NEGATIVE MG/DL
LEUKOCYTE ESTERASE UR QL STRIP: ABNORMAL
NITRATE UR QL: POSITIVE
PH UR STRIP: 6 [PH] (ref 5–7)
RBC #/AREA URNS AUTO: ABNORMAL /HPF
SP GR UR STRIP: >=1.03 (ref 1–1.03)
SQUAMOUS #/AREA URNS AUTO: ABNORMAL /LPF
TRICHOMONAS, WET PREP: ABNORMAL
UROBILINOGEN UR STRIP-ACNC: 0.2 E.U./DL
WBC #/AREA URNS AUTO: ABNORMAL /HPF
WBC'S/HIGH POWER FIELD, WET PREP: ABNORMAL
YEAST, WET PREP: ABNORMAL

## 2024-06-13 PROCEDURE — 87186 SC STD MICRODIL/AGAR DIL: CPT

## 2024-06-13 PROCEDURE — 87210 SMEAR WET MOUNT SALINE/INK: CPT

## 2024-06-13 PROCEDURE — 81001 URINALYSIS AUTO W/SCOPE: CPT

## 2024-06-13 PROCEDURE — 87086 URINE CULTURE/COLONY COUNT: CPT

## 2024-06-13 PROCEDURE — 99214 OFFICE O/P EST MOD 30 MIN: CPT

## 2024-06-13 RX ORDER — CEFDINIR 300 MG/1
300 CAPSULE ORAL 2 TIMES DAILY
Qty: 14 CAPSULE | Refills: 0 | Status: SHIPPED | OUTPATIENT
Start: 2024-06-13 | End: 2024-06-20

## 2024-06-13 NOTE — PROGRESS NOTES
URGENT CARE  Assessment & Plan   Assessment:   Zaida Zelaya is a 43 year old female who's clinical presentation today is consistent with:   1. Urinary tract infection without hematuria, recurrent   - UA Macroscopic with reflex to Microscopic and Culture  - Wet preparation  - cefdinir (OMNICEF) 300 MG capsule;   - Adult Uro/Gyn  Referral; Future  Plan:  Will treat patient's recurrent cystitis, with antibiotics at this time, culture pending and will call if a different antibiotic is needed   additionally encouraged patient to increase fluid intake, practice good hygiene (wiping front to back, voiding after sexual intercourse), and avoidance of deodorant sprays.   Additionally we discussed if symptoms do not improve after starting today's treatment to follow up in 5-7 days, sooner if symptoms worsen, return precautions given    No alarm signs or symptoms present   Differential Diagnoses for this patient's chief complaint that I considered include:  Bacterial vaginosis, candidiasis, Vulvovaginitis, atrophic vaginitis, Overactive bladder, urethritis, interstitial cystitis, urethral irritation,  Pyelonephritis, nephrolithiasis, Pelvic organ (uterine/bladder) prolapse, Foreign body in bladder,  Atypical etiology of cystitis: fungal, viral, contact vs allergic vaginitis,  Malignancy (vaginal, ovarian, cervical)     Patient is agreeable to treatment plan and state they will follow-up if symptoms do not improve and/or if symptoms worsen   see patient's AVS 'monitor for' section for specific patient instructions given and discussed regarding what to watch for and when to follow up    AMANDA Pickard [facility] C[facility] [place]      ______________________________________________________________________      Subjective     HPI: Zaida Zelaya  is a 43 year old  female who presents today for evaluation the following concerns:   The patient presents today complaining of urinary frequency,  burning and bloating  for 2 days    Patient denies} blood in urine  Patient endorses having a past history of frequent urinary tract infections, states she was just treated last week, she was better while on the antibiotics and then it came back again. Patient denies back pain/flank pain, fever, chills, or any abnormal vaginal discharge/odor or bleeding.     Review of Systems:  Pertinent review of systems as reflected in HPI, otherwise negative.     Objective    Physical Exam:  Vitals:    06/13/24 1700   BP: 119/72   Pulse: 70   Resp: 16   Temp: 97.8  F (36.6  C)   TempSrc: Tympanic   SpO2: 100%   Weight: 60.8 kg (134 lb)     General: Alert and oriented, no acute distress, afebrile, normotensive  Psy/mental status: Nonanxious, cooperative  SKIN: Intact, no open areas  ABDOMEN:  soft, non-tender, non-distended    No flank pain or CVA tenderness   Pelvic/ :   Deferred    LABS:   Results for orders placed or performed in visit on 06/13/24   UA Macroscopic with reflex to Microscopic and Culture     Status: Abnormal    Specimen: Urine, Clean Catch   Result Value Ref Range    Color Urine Yellow Colorless, Straw, Light Yellow, Yellow    Appearance Urine Clear Clear    Glucose Urine Negative Negative mg/dL    Bilirubin Urine Negative Negative    Ketones Urine Negative Negative mg/dL    Specific Gravity Urine >=1.030 1.003 - 1.035    Blood Urine Large (A) Negative    pH Urine 6.0 5.0 - 7.0    Protein Albumin Urine 30 (A) Negative mg/dL    Urobilinogen Urine 0.2 0.2, 1.0 E.U./dL    Nitrite Urine Positive (A) Negative    Leukocyte Esterase Urine Small (A) Negative   Urine Microscopic Exam     Status: Abnormal   Result Value Ref Range    Bacteria Urine Few (A) None Seen /HPF    RBC Urine 2-5 (A) 0-2 /HPF /HPF    WBC Urine 10-25 (A) 0-5 /HPF /HPF    Squamous Epithelials Urine Few (A) None Seen /LPF   Wet preparation     Status: Abnormal    Specimen: Vagina; Swab   Result Value Ref Range    Trichomonas Absent Absent    Yeast  Absent Absent    Clue Cells Absent Absent    WBCs/high power field 2+ (A) None         ______________________________________________________________________    I explained my diagnostic considerations and recommendations to the patient, who voiced understanding and agreement with the treatment plan.   All questions were answered.   We discussed potential side effects, risks and benefits of any prescribed or recommended therapies, as well as expectations for response to treatments.  Please see AVS for any patient instructions & handouts given.   Patient was advised to contact the Nurse Care Line, their Primary Care provider, Urgent Care, or the Emergency Department if there are new or worsening symptoms, or call 911 for emergencies.

## 2024-06-13 NOTE — TELEPHONE ENCOUNTER
"Per UC visit 6/6/24. \"Will treat patient's recurrent cystitis, with antibiotics at this time, culture pending and will call if a different antibiotic is needed, no concerns for systemic illness or pyelonephritis at this time, but will monitor given mild back pain. Afebrile and symptom duration is two days     Additionally we discussed if symptoms do not improve after starting today's treatment to follow up in 5-7 days, sooner if symptoms worsen, return precautions given  \"    Please Advise    Gay GATICA RN    "

## 2024-06-14 RX ORDER — NITROFURANTOIN 25; 75 MG/1; MG/1
100 CAPSULE ORAL 2 TIMES DAILY
Qty: 10 CAPSULE | Refills: 0 | Status: SHIPPED | OUTPATIENT
Start: 2024-06-14 | End: 2024-06-19

## 2024-06-14 NOTE — TELEPHONE ENCOUNTER
Macrobid prescribed, drink plenty of water - may try over the counter Azo for urinary pain relief - this may cause urine to turn orange  Can stop keflex once taking macrobid  If patient develops persistent back pain or fever go see a provider either urgent care or ed for concern of kidney infection  Looks like she was referred to urology already yesterday  Culture still pending

## 2024-06-15 LAB
BACTERIA UR CULT: ABNORMAL
BACTERIA UR CULT: ABNORMAL

## 2024-08-14 ENCOUNTER — E-VISIT (OUTPATIENT)
Dept: URGENT CARE | Facility: CLINIC | Age: 44
End: 2024-08-14
Payer: COMMERCIAL

## 2024-08-14 DIAGNOSIS — R30.0 DYSURIA: Primary | ICD-10-CM

## 2024-08-14 PROCEDURE — 99422 OL DIG E/M SVC 11-20 MIN: CPT | Performed by: PHYSICIAN ASSISTANT

## 2024-08-14 RX ORDER — SULFAMETHOXAZOLE/TRIMETHOPRIM 800-160 MG
1 TABLET ORAL 2 TIMES DAILY
Qty: 6 TABLET | Refills: 0 | Status: SHIPPED | OUTPATIENT
Start: 2024-08-14 | End: 2024-08-17

## 2024-08-14 NOTE — PATIENT INSTRUCTIONS
Urinary Tract Infection (UTI) in Women: Care Instructions  Overview     A urinary tract infection (UTI) is an infection caused by bacteria. It can happen anywhere in the urinary tract. A UTI can happen in the:  Kidneys.  Ureters, the tubes that connect the kidneys to the bladder.  Bladder.  Urethra, where the urine comes out.  Most UTIs are bladder infections. They often cause pain or burning when you urinate.  Most UTIs can be cured with antibiotics. If you are prescribed antibiotics, be sure to complete your treatment so that the infection does not get worse.  Follow-up care is a key part of your treatment and safety. Be sure to make and go to all appointments, and call your doctor if you are having problems. It's also a good idea to know your test results and keep a list of the medicines you take.  How can you care for yourself at home?  Take your antibiotics as directed. Do not stop taking them just because you feel better. You need to take the full course of antibiotics.  Drink extra water and other fluids for the next day or two. This will help make the urine less concentrated and help wash out the bacteria that are causing the infection. (If you have kidney, heart, or liver disease and have to limit fluids, talk with your doctor before you increase the amount of fluids you drink.)  Avoid drinks that are carbonated or have caffeine. They can irritate the bladder.  Urinate often. Try to empty your bladder each time.  To relieve pain, take a hot bath or lay a heating pad set on low over your lower belly or genital area. Never go to sleep with a heating pad in place.  To prevent UTIs  Drink plenty of water each day. This helps you urinate often, which clears bacteria from your system. (If you have kidney, heart, or liver disease and have to limit fluids, talk with your doctor before you increase the amount of fluids you drink.)  Urinate when you need to.  If you are sexually active, urinate right after you have  "sex.  Change sanitary pads often.  Avoid douches, bubble baths, feminine hygiene sprays, and other feminine hygiene products that have deodorants.  After going to the bathroom, wipe from front to back.  When should you call for help?   Call your doctor now or seek immediate medical care if:    You have new or worse fever, chills, nausea, or vomiting.     You have new pain in your back just below your rib cage. This is called flank pain.     There is new blood or pus in your urine.     You have any problems with your antibiotic medicine.   Watch closely for changes in your health, and be sure to contact your doctor if:    You are not getting better after taking an antibiotic for 2 days.     Your symptoms go away but then come back.   Where can you learn more?  Go to https://www.[employer].net/patiented  Enter K848 in the search box to learn more about \"Urinary Tract Infection (UTI) in Women: Care Instructions.\"  Current as of: November 15, 2023               Content Version: 14.0    [id number] [employer].   Care instructions adapted under license by your healthcare professional. If you have questions about a medical condition or this instruction, always ask your healthcare professional. [employer] disclaims any warranty or liability for your use of this information.    Go to the emergency department within 12 hours if back pain or abdominal pain worsen    "

## 2024-08-16 ENCOUNTER — OFFICE VISIT (OUTPATIENT)
Dept: UROLOGY | Facility: CLINIC | Age: 44
End: 2024-08-16
Payer: COMMERCIAL

## 2024-08-16 VITALS
HEIGHT: 63 IN | HEART RATE: 70 BPM | WEIGHT: 135 LBS | BODY MASS INDEX: 23.92 KG/M2 | OXYGEN SATURATION: 100 % | TEMPERATURE: 97.5 F | DIASTOLIC BLOOD PRESSURE: 76 MMHG | SYSTOLIC BLOOD PRESSURE: 116 MMHG

## 2024-08-16 DIAGNOSIS — N39.0 FREQUENT UTI: ICD-10-CM

## 2024-08-16 PROCEDURE — 99203 OFFICE O/P NEW LOW 30 MIN: CPT | Mod: 25 | Performed by: STUDENT IN AN ORGANIZED HEALTH CARE EDUCATION/TRAINING PROGRAM

## 2024-08-16 PROCEDURE — 51798 US URINE CAPACITY MEASURE: CPT | Performed by: STUDENT IN AN ORGANIZED HEALTH CARE EDUCATION/TRAINING PROGRAM

## 2024-08-16 RX ORDER — ESTRADIOL 0.1 MG/G
2 CREAM VAGINAL
Qty: 42.5 G | Refills: 3 | Status: SHIPPED | OUTPATIENT
Start: 2024-08-19 | End: 2024-09-30

## 2024-08-16 ASSESSMENT — PAIN SCALES - GENERAL: PAINLEVEL: NO PAIN (0)

## 2024-08-16 NOTE — PATIENT INSTRUCTIONS
Supplements to prevent UTIs:  1. Cranberry-use as directed   a. Ellura: www.myellura.com   b. Theracran HP by [employer]   2. Vitamin C 500-1000 mg twice daily  3. D-mannose 2 g daily

## 2024-08-16 NOTE — PROGRESS NOTES
Chief Complaint:   Frequent UTIs         History of Present Illness:   Zaida Zelaya is a 43 year old female with a history of HLD, fibromyalgia, tobacco use disorder, and Raynaud's syndrome who presents for evaluation of frequent UTIs.     The patient reports frequent UTIs over the last 2-3 years. This has happened in the past and she was on a limited course of daily Macrobid, which helped for several years. Her typical symptoms include dysuria and lower back pain. She denies gross hematuria.     She has a longstanding history of constipation. She has been scheduled to have two colonoscopies, but they were not able to visualize her colon.     She denies any association between UTIs and sexual activity.     She denies a history of urologic procedures. She denies a history of kidney stones.     She is having less frequent menstrual periods, about every 2-3 months. She does endorse vaginal dryness.          Past Medical History:   No past medical history on file.         Past Surgical History:     Past Surgical History:   Procedure Laterality Date    BREAST SURGERY      CARPAL TUNNEL RELEASE RT/LT  2013    left only; мария Zelaya     ENLARGE BREAST WITH IMPLANT  2007    RELEASE CARPAL TUNNEL Left             Medications     Current Outpatient Medications   Medication Sig Dispense Refill    cyanocobalamin (VITAMIN B-12) 100 MCG tablet       desonide (DESOWEN) 0.05 % external cream Apply topically 2 times daily 60 g 0    desvenlafaxine (PRISTIQ) 50 MG 24 hr tablet Take 50 mg by mouth daily      escitalopram (LEXAPRO) 10 MG tablet Take 10 mg by mouth daily      ibuprofen (ADVIL/MOTRIN) 800 MG tablet Take 800 mg by mouth 3 times daily as needed for pain or other (headache)      multivitamin w/minerals (THERA-VIT-M) tablet Take 1 tablet by mouth daily      ondansetron (ZOFRAN) 4 MG tablet Take 4 mg by mouth every 8 hours as needed for nausea      sulfamethoxazole-trimethoprim (BACTRIM DS)  "800-160 MG tablet Take 1 tablet by mouth 2 times daily for 3 days 6 tablet 0    SUMAtriptan (IMITREX) 100 MG tablet Take 100 mg by mouth at onset of headache for migraine , may repeat after 2 hours if needed (maximum of 2 tablets per day)      tretinoin (RETIN-A) 0.025 % external cream Apply topically at bedtime 45 g 0     No current facility-administered medications for this visit.            Allergies:   Chantix [varenicline]         Review of Systems:  From intake questionnaire   Negative 14 system review except as noted on HPI, nurse's note.         Physical Exam:   Patient is a 43 year old  female   Vitals: Blood pressure 116/76, pulse 70, temperature 97.5  F (36.4  C), temperature source Tympanic, height 1.607 m (5' 3.25\"), weight 61.2 kg (135 lb), SpO2 100%.  General Appearance Adult: Alert, no acute distress, oriented.  Lungs: Non-labored breathing.  Heart: No obvious jugular venous distension present.  Neuro: Alert, oriented, speech and mentation normal    PVR: 37 mL      Labs and Pathology:    I personally reviewed all applicable laboratory data and went over findings with patient  Significant for:    CBC RESULTS:  Recent Labs   Lab Test 05/01/24  1446 11/08/22  1631 07/29/20  1428 09/26/19  1127   WBC 4.7 6.6 6.5 11.9*   HGB 11.1* 11.1* 10.6* 9.4*    294 304 297        BMP RESULTS:  Recent Labs   Lab Test 05/01/24  1446 11/08/22  1631 07/29/20  1428 09/26/19  1127 10/26/18  1656 07/02/18  1725 07/02/18  1725    140 137 138 138   < >  --    POTASSIUM 4.2 3.3* 4.5 2.9* 4.6   < >  --    CHLORIDE 107 102 104 100 106   < >  --    CO2 27 25 24 28 26   < >  --    ANIONGAP 6* 13 9 10 6   < >  --    GLC 90 111* 83 91 93   < >  --    BUN 12.3 10.8 12 7* 17   < >  --    CR 0.74 0.73 0.83 0.72 0.71  --  0.84   GFRESTIMATED >90 >90 >60 >60 >60  --  >60   GFRESTBLACK  --   --  >60 >60 >60  --  >60   GERI 8.5* 9.2 9.4 8.9 9.7   < >  --     < > = values in this interval not displayed.       UA RESULTS: "   Recent Labs   Lab Test 06/13/24  1651 06/06/24  1606 09/26/19  1117   SG >=1.030 >=1.030 1.013   URINEPH 6.0 5.5 6.0   NITRITE Positive* Positive* Negative   RBCU 2-5* 2-5* 3-5*   WBCU 10-25* 25-50* *            Assessment and Plan:     Assessment: 43 year old female seen in evaluation for frequent UTIs.     We discussed preventative measures for UTIs including hydration, management of irregular bowel movements, proper hygiene, supplementation with vitamin C or cranberry, vaginal estrogen cream, methenamine with vitamin C, post-coital antibiotic prophylaxis, and daily antibiotic prophylaxis. She recently started a cranberry supplement and probiotic.     She is having less frequent menstrual periods, about every 2-3 months. She does endorse vaginal dryness. We will start vaginal estrogen cream.    Plan:  Continue daily probiotic and cranberry supplement.   Start vaginal estrogen cream two nights per week.   Standing UA orders for symptoms of a UTI.   Follow up in 2-3 months, sooner if concerns.     DONTAE PEDERSON PA-C  Department of Urology

## 2024-09-29 ENCOUNTER — LAB (OUTPATIENT)
Dept: LAB | Facility: CLINIC | Age: 44
End: 2024-09-29
Payer: COMMERCIAL

## 2024-09-29 DIAGNOSIS — N39.0 FREQUENT UTI: ICD-10-CM

## 2024-09-29 DIAGNOSIS — Z13.6 CARDIOVASCULAR SCREENING; LDL GOAL LESS THAN 160: ICD-10-CM

## 2024-09-29 DIAGNOSIS — Z86.39 HISTORY OF IRON DEFICIENCY: ICD-10-CM

## 2024-09-29 DIAGNOSIS — E53.8 B12 DEFICIENCY: ICD-10-CM

## 2024-09-29 LAB
ALBUMIN UR-MCNC: NEGATIVE MG/DL
AMORPH CRY #/AREA URNS HPF: ABNORMAL /HPF
APPEARANCE UR: ABNORMAL
BACTERIA #/AREA URNS HPF: ABNORMAL /HPF
BILIRUB UR QL STRIP: NEGATIVE
CHOLEST SERPL-MCNC: 210 MG/DL
COLOR UR AUTO: YELLOW
ERYTHROCYTE [DISTWIDTH] IN BLOOD BY AUTOMATED COUNT: 12.7 % (ref 10–15)
FASTING STATUS PATIENT QL REPORTED: YES
FERRITIN SERPL-MCNC: 19 NG/ML (ref 6–175)
GLUCOSE UR STRIP-MCNC: NEGATIVE MG/DL
HCT VFR BLD AUTO: 41.2 % (ref 35–47)
HDLC SERPL-MCNC: 67 MG/DL
HGB BLD-MCNC: 13.2 G/DL (ref 11.7–15.7)
HGB UR QL STRIP: NEGATIVE
KETONES UR STRIP-MCNC: NEGATIVE MG/DL
LDLC SERPL CALC-MCNC: 127 MG/DL
LEUKOCYTE ESTERASE UR QL STRIP: ABNORMAL
MCH RBC QN AUTO: 28.5 PG (ref 26.5–33)
MCHC RBC AUTO-ENTMCNC: 32 G/DL (ref 31.5–36.5)
MCV RBC AUTO: 89 FL (ref 78–100)
MUCOUS THREADS #/AREA URNS LPF: PRESENT /LPF
NITRATE UR QL: POSITIVE
NONHDLC SERPL-MCNC: 143 MG/DL
PH UR STRIP: 6.5 [PH] (ref 5–7)
PLATELET # BLD AUTO: 284 10E3/UL (ref 150–450)
RBC # BLD AUTO: 4.63 10E6/UL (ref 3.8–5.2)
RBC #/AREA URNS AUTO: ABNORMAL /HPF
SP GR UR STRIP: 1.02 (ref 1–1.03)
SQUAMOUS #/AREA URNS AUTO: ABNORMAL /LPF
TRIGL SERPL-MCNC: 79 MG/DL
UROBILINOGEN UR STRIP-ACNC: 0.2 E.U./DL
VIT B12 SERPL-MCNC: 514 PG/ML (ref 232–1245)
WBC # BLD AUTO: 4.7 10E3/UL (ref 4–11)
WBC #/AREA URNS AUTO: ABNORMAL /HPF

## 2024-09-29 PROCEDURE — 82728 ASSAY OF FERRITIN: CPT

## 2024-09-29 PROCEDURE — 85027 COMPLETE CBC AUTOMATED: CPT

## 2024-09-29 PROCEDURE — 80061 LIPID PANEL: CPT

## 2024-09-29 PROCEDURE — 36415 COLL VENOUS BLD VENIPUNCTURE: CPT

## 2024-09-29 PROCEDURE — 87086 URINE CULTURE/COLONY COUNT: CPT

## 2024-09-29 PROCEDURE — 82607 VITAMIN B-12: CPT

## 2024-09-29 PROCEDURE — 87088 URINE BACTERIA CULTURE: CPT

## 2024-09-29 PROCEDURE — 81001 URINALYSIS AUTO W/SCOPE: CPT

## 2024-09-29 PROCEDURE — 87186 SC STD MICRODIL/AGAR DIL: CPT

## 2024-09-30 ENCOUNTER — MYC REFILL (OUTPATIENT)
Dept: UROLOGY | Facility: CLINIC | Age: 44
End: 2024-09-30
Payer: COMMERCIAL

## 2024-09-30 DIAGNOSIS — N39.0 FREQUENT UTI: ICD-10-CM

## 2024-09-30 DIAGNOSIS — N30.00 ACUTE CYSTITIS WITHOUT HEMATURIA: Primary | ICD-10-CM

## 2024-09-30 RX ORDER — ESTRADIOL 0.1 MG/G
2 CREAM VAGINAL
Qty: 42.5 G | Refills: 3 | Status: SHIPPED | OUTPATIENT
Start: 2024-09-30

## 2024-09-30 RX ORDER — SULFAMETHOXAZOLE/TRIMETHOPRIM 800-160 MG
1 TABLET ORAL 2 TIMES DAILY
Qty: 10 TABLET | Refills: 0 | Status: SHIPPED | OUTPATIENT
Start: 2024-09-30 | End: 2024-10-01 | Stop reason: ALTCHOICE

## 2024-10-01 LAB — BACTERIA UR CULT: ABNORMAL

## 2024-10-29 ENCOUNTER — OFFICE VISIT (OUTPATIENT)
Dept: DERMATOLOGY | Facility: CLINIC | Age: 44
End: 2024-10-29
Payer: COMMERCIAL

## 2024-10-29 ENCOUNTER — TELEPHONE (OUTPATIENT)
Dept: DERMATOLOGY | Facility: CLINIC | Age: 44
End: 2024-10-29

## 2024-10-29 DIAGNOSIS — L28.2 PRURIGO: Primary | ICD-10-CM

## 2024-10-29 PROCEDURE — 99203 OFFICE O/P NEW LOW 30 MIN: CPT | Performed by: PHYSICIAN ASSISTANT

## 2024-10-29 ASSESSMENT — PAIN SCALES - GENERAL: PAINLEVEL_OUTOF10: NO PAIN (0)

## 2024-10-29 NOTE — NURSING NOTE
Chief Complaint   Patient presents with    Derm Problem     Shedding of skin, face, arms, chest and under breast - years no pain with skin        There were no vitals filed for this visit.  Wt Readings from Last 1 Encounters:   08/16/24 61.2 kg (135 lb)       Brittnee Angulo LPN .................10/29/2024

## 2024-10-29 NOTE — LETTER
10/29/2024      Zaida Zelaya  [address]\"" [address]      Dear Colleague,    Thank you for referring your patient, Zaida Zelaya, to the [facility]. Please see a copy of my visit note below.    Zaida Zelaya is a pleasant 44 year old year old female patient here today for peeling, open wounds that come and go. She notes will leave scars when healed. She has some scars on face and arms. She notes this has been going on for 8-9 years.  Patient has no other skin complaints today.  Remainder  No past medical history on file.    Past Surgical History:   Procedure Laterality Date     BREAST SURGERY       CARPAL TUNNEL RELEASE RT/LT  2013    left only; [facility]     HC ENLARGE BREAST WITH IMPLANT  2007     RELEASE CARPAL TUNNEL Left         Family History   Problem Relation Age of Onset     Thyroid Disease Mother      Eye Disorder Mother 50        glaucoma     Depression Father      Neurologic Disorder Father         migraines     Gastrointestinal Disease Father 50        had a colon polyp removed     Asthma Maternal Grandmother 61     Arthritis Maternal Grandmother      Allergies Maternal Grandmother      Alcohol/Drug Maternal Grandfather 61        liver failure     Asthma Paternal Grandmother      Arthritis Paternal Grandmother      Cancer Paternal Grandmother      Cancer Paternal Grandfather 84        melonoma     Alzheimer Disease Paternal Grandfather      Skin Cancer Maternal Aunt        Social History     Socioeconomic History     Marital status: Patient Declined     Spouse name: Not on file     Number of children: Not on file     Years of education: Not on file     Highest education level: Not on file   Occupational History     Not on file   Tobacco Use     Smoking status: Every Day     Current packs/day: 1.00     Types: Cigarettes     Smokeless tobacco: Never   Vaping Use     Vaping status: Never Used   Substance and Sexual Activity     Alcohol use:  No     Drug use: No     Sexual activity: Yes     Partners: Male   Other Topics Concern     Parent/sibling w/ CABG, MI or angioplasty before 65F 55M? Not Asked   Social History Narrative     Not on file     Social Drivers of Health     Financial Resource Strain: Low Risk  (4/1/2024)    Financial Resource Strain      Within the past 12 months, have you or your family members you live with been unable to get utilities (heat, electricity) when it was really needed?: No   Food Insecurity: Low Risk  (4/1/2024)    Food Insecurity      Within the past 12 months, did you worry that your food would run out before you got money to buy more?: No      Within the past 12 months, did the food you bought just not last and you didn t have money to get more?: No   Transportation Needs: Low Risk  (4/1/2024)    Transportation Needs      Within the past 12 months, has lack of transportation kept you from medical appointments, getting your medicines, non-medical meetings or appointments, work, or from getting things that you need?: No   Physical Activity: Not on file   Stress: Not on file   Social Connections: Not on file   Interpersonal Safety: Low Risk  (4/1/2024)    Interpersonal Safety      Do you feel physically and emotionally safe where you currently live?: Yes      Within the past 12 months, have you been hit, slapped, kicked or otherwise physically hurt by someone?: No      Within the past 12 months, have you been humiliated or emotionally abused in other ways by your partner or ex-partner?: No   Housing Stability: Low Risk  (4/1/2024)    Housing Stability      Do you have housing? : Yes      Are you worried about losing your housing?: No       Outpatient Encounter Medications as of 10/29/2024   Medication Sig Dispense Refill     dupilumab (DUPIXENT) 300 MG/2ML prefilled pen Inject 4 mLs (600 mg) subcutaneously See Admin Instructions for 1 dose. followed by 300 mg every other week. 4 mL 0     dupilumab (DUPIXENT) 300 MG/2ML  prefilled pen Inject 2 mLs (300 mg) subcutaneously every 14 days. 4 mL 5     cyanocobalamin (VITAMIN B-12) 100 MCG tablet        desonide (DESOWEN) 0.05 % external cream Apply topically 2 times daily 60 g 0     desvenlafaxine (PRISTIQ) 50 MG 24 hr tablet Take 50 mg by mouth daily       escitalopram (LEXAPRO) 10 MG tablet Take 10 mg by mouth daily       estradiol (ESTRACE) 0.1 MG/GM vaginal cream Place 2 g vaginally twice a week. 42.5 g 3     ibuprofen (ADVIL/MOTRIN) 800 MG tablet Take 800 mg by mouth 3 times daily as needed for pain or other (headache)       multivitamin w/minerals (THERA-VIT-M) tablet Take 1 tablet by mouth daily       ondansetron (ZOFRAN) 4 MG tablet Take 4 mg by mouth every 8 hours as needed for nausea       SUMAtriptan (IMITREX) 100 MG tablet Take 100 mg by mouth at onset of headache for migraine , may repeat after 2 hours if needed (maximum of 2 tablets per day)       tretinoin (RETIN-A) 0.025 % external cream Apply topically at bedtime 45 g 0     No facility-administered encounter medications on file as of 10/29/2024.             O:   NAD, WDWN, Alert & Oriented, Mood & Affect wnl, Vitals stable   Here today alone   There were no vitals taken for this visit.   General appearance normal   Vitals stable   Alert, oriented and in no acute distress      White scarring on face, arms   Scabs on arms   No areas psoriasis     Eyes: Conjunctivae/lids:Normal     ENT: Lips normal    MSK:Normal    Pulm: Breathing Normal    Neuro/Psych: Orientation:Alert and Orientedx3 ; Mood/Affect:normal   A/P:  1. Prurigo nodularis  Pictures of prurigo shown on pt phone.   This is causing visible scarring on face.   Recommend to start dupixent, has had over >20 lesions at a time in the past.   Discussed potential side effects.   She would like to try dupixent, prescription was sent.   Recheck in 4 months.       Again, thank you for allowing me to participate in the care of your patient.        Sincerely,        Cris  Lavern Kapoor PA-C

## 2024-10-29 NOTE — TELEPHONE ENCOUNTER
PA Initiation    Medication: DUPIXENT 300 MG/2ML SC SOAJ  Insurance Company: [employer] - Phone [phone] Fax [phone]  Pharmacy Filling the Rx:    Filling Pharmacy Phone:    Filling Pharmacy Fax:    Start Date: 10/29/2024    Key:[id number]

## 2024-10-30 NOTE — TELEPHONE ENCOUNTER
Prior Authorization Approval    Medication: DUPIXENT 300 MG/2ML SC SOAJ  Authorization Effective Date: 10/29/2024  Authorization Expiration Date: 4/29/2025  Approved Dose/Quantity: 6ML/28 DAYS ( LOADING DOSE) 4ML/28 DAYS ( MAINTENANCE DOSE)  Reference #: [id number]   Insurance Company: [employer] - Phone [phone] Fax [phone]  Expected CoPay: $ 0  CoPay Card Available:      Financial Assistance Needed:   Which Pharmacy is filling the prescription:    Pharmacy Notified: yes  Patient Notified: yes

## 2024-10-30 NOTE — PROGRESS NOTES
Zaida Zelaya is a pleasant 44 year old year old female patient here today for peeling, open wounds that come and go. She notes will leave scars when healed. She has some scars on face and arms. She notes this has been going on for 8-9 years.  Patient has no other skin complaints today.  Remainder  No past medical history on file.    Past Surgical History:   Procedure Laterality Date    BREAST SURGERY      CARPAL TUNNEL RELEASE RT/LT  2013    [facility] only; [facility]    HC ENLARGE BREAST WITH IMPLANT  2007    RELEASE CARPAL TUNNEL Left         Family History   Problem Relation Age of Onset    Thyroid Disease Mother     Eye Disorder Mother 50        glaucoma    Depression Father     Neurologic Disorder Father         migraines    Gastrointestinal Disease Father 50        had a colon polyp removed    Asthma Maternal Grandmother 61    Arthritis Maternal Grandmother     Allergies Maternal Grandmother     Alcohol/Drug Maternal Grandfather 61        liver failure    Asthma Paternal Grandmother     Arthritis Paternal Grandmother     Cancer Paternal Grandmother     Cancer Paternal Grandfather 84        melonoma    Alzheimer Disease Paternal Grandfather     Skin Cancer Maternal Aunt        Social History     Socioeconomic History    Marital status: Patient Declined     Spouse name: Not on file    Number of children: Not on file    Years of education: Not on file    Highest education level: Not on file   Occupational History    Not on file   Tobacco Use    Smoking status: Every Day     Current packs/day: 1.00     Types: Cigarettes    Smokeless tobacco: Never   Vaping Use    Vaping status: Never Used   Substance and Sexual Activity    Alcohol use: No    Drug use: No    Sexual activity: Yes     Partners: Male   Other Topics Concern    Parent/sibling w/ CABG, MI or angioplasty before 65F 55M? Not Asked   Social History Narrative    Not on file     Social Drivers of Health     Financial Resource Strain: Low  Risk  (4/1/2024)    Financial Resource Strain     Within the past 12 months, have you or your family members you live with been unable to get utilities (heat, electricity) when it was really needed?: No   Food Insecurity: Low Risk  (4/1/2024)    Food Insecurity     Within the past 12 months, did you worry that your food would run out before you got money to buy more?: No     Within the past 12 months, did the food you bought just not last and you didn t have money to get more?: No   Transportation Needs: Low Risk  (4/1/2024)    Transportation Needs     Within the past 12 months, has lack of transportation kept you from medical appointments, getting your medicines, non-medical meetings or appointments, work, or from getting things that you need?: No   Physical Activity: Not on file   Stress: Not on file   Social Connections: Not on file   Interpersonal Safety: Low Risk  (4/1/2024)    Interpersonal Safety     Do you feel physically and emotionally safe where you currently live?: Yes     Within the past 12 months, have you been hit, slapped, kicked or otherwise physically hurt by someone?: No     Within the past 12 months, have you been humiliated or emotionally abused in other ways by your partner or ex-partner?: No   Housing Stability: Low Risk  (4/1/2024)    Housing Stability     Do you have housing? : Yes     Are you worried about losing your housing?: No       Outpatient Encounter Medications as of 10/29/2024   Medication Sig Dispense Refill    dupilumab (DUPIXENT) 300 MG/2ML prefilled pen Inject 4 mLs (600 mg) subcutaneously See Admin Instructions for 1 dose. followed by 300 mg every other week. 4 mL 0    dupilumab (DUPIXENT) 300 MG/2ML prefilled pen Inject 2 mLs (300 mg) subcutaneously every 14 days. 4 mL 5    cyanocobalamin (VITAMIN B-12) 100 MCG tablet       desonide (DESOWEN) 0.05 % external cream Apply topically 2 times daily 60 g 0    desvenlafaxine (PRISTIQ) 50 MG 24 hr tablet Take 50 mg by mouth daily       escitalopram (LEXAPRO) 10 MG tablet Take 10 mg by mouth daily      estradiol (ESTRACE) 0.1 MG/GM vaginal cream Place 2 g vaginally twice a week. 42.5 g 3    ibuprofen (ADVIL/MOTRIN) 800 MG tablet Take 800 mg by mouth 3 times daily as needed for pain or other (headache)      multivitamin w/minerals (THERA-VIT-M) tablet Take 1 tablet by mouth daily      ondansetron (ZOFRAN) 4 MG tablet Take 4 mg by mouth every 8 hours as needed for nausea      SUMAtriptan (IMITREX) 100 MG tablet Take 100 mg by mouth at onset of headache for migraine , may repeat after 2 hours if needed (maximum of 2 tablets per day)      tretinoin (RETIN-A) 0.025 % external cream Apply topically at bedtime 45 g 0     No facility-administered encounter medications on file as of 10/29/2024.             O:   NAD, WDWN, Alert & Oriented, Mood & Affect wnl, Vitals stable   Here today alone   There were no vitals taken for this visit.   General appearance normal   Vitals stable   Alert, oriented and in no acute distress      White scarring on face, arms   Scabs on arms   No areas psoriasis     Eyes: Conjunctivae/lids:Normal     ENT: Lips normal    MSK:Normal    Pulm: Breathing Normal    Neuro/Psych: Orientation:Alert and Orientedx3 ; Mood/Affect:normal   A/P:  1. Prurigo nodularis  Pictures of prurigo shown on pt phone.   This is causing visible scarring on face.   Recommend to start dupixent, has had over >20 lesions at a time in the past.   Discussed potential side effects.   She would like to try dupixent, prescription was sent.   Recheck in 4 months.

## 2024-11-11 ENCOUNTER — TELEPHONE (OUTPATIENT)
Dept: DERMATOLOGY | Facility: CLINIC | Age: 44
End: 2024-11-11
Payer: COMMERCIAL

## 2024-11-11 NOTE — TELEPHONE ENCOUNTER
Tried to reach patient by phone, call did not go through. My chart message sent.     Kacey Esposito RN

## 2024-11-11 NOTE — LETTER
Zaida Zelaya  [address] [place] [address]        November 15, 2024      Dear Zaida Zelaya,      The [facility] is trying to reach you to set up Dupixent delivery.    Please call the pharmacy back at [phone] or [phone].    If you do not wish to set up delivery, please respond to this message or call and let the pharmacy know.    Thank you,          Your [facility] care team

## 2024-11-13 NOTE — TELEPHONE ENCOUNTER
Patient has not read [employer] message yet but generally is active in [employer]. Will watch.    Luisa Oates RN on 11/13/2024 at 8:08 AM

## 2024-11-14 NOTE — TELEPHONE ENCOUNTER
Pt has not read My chart message.     Patient Contact    Attempt # 1    Was call answered?  No. Listed home number [phone] not in service, so left message at listed home number [phone] that pharmacy is trying to reach you to set up medication delivery, so call pharmacy back, or read My chart message we sent you or call us back for further details.    Called patient. No answer. Left message to call back. Clinic number was provided.     Kacey Esposito RN    [facility]   [phone]

## 2024-12-03 ENCOUNTER — TELEPHONE (OUTPATIENT)
Dept: FAMILY MEDICINE | Facility: CLINIC | Age: 44
End: 2024-12-03
Payer: COMMERCIAL

## 2024-12-03 NOTE — TELEPHONE ENCOUNTER
Prior Authorization Retail Medication Request    Medication/Dose: PA Required for desvenlafaxine  Diagnosis and ICD code (if different than what is on RX):    New/renewal/insurance change PA/secondary ins. PA  Previously Tried and Failed:    Rationale:     Insurance   Primary: [employer]  Insurance ID:  [id number]      Pharmacy Information (if different than what is on RX)  Name:  [facility]  Phone:  [phone]  Fax: [phone]

## 2024-12-05 NOTE — TELEPHONE ENCOUNTER
I do not see a current script from a [facility] provider for this medication in epic; the PA team is unable to do a prior authorization as we would need to know the medication strength/sig/diagnosis in order to initiate an authorization.

## 2024-12-06 ENCOUNTER — MYC REFILL (OUTPATIENT)
Dept: FAMILY MEDICINE | Facility: CLINIC | Age: 44
End: 2024-12-06

## 2024-12-06 ENCOUNTER — MYC MEDICAL ADVICE (OUTPATIENT)
Dept: FAMILY MEDICINE | Facility: CLINIC | Age: 44
End: 2024-12-06

## 2024-12-06 DIAGNOSIS — G43.809 OTHER MIGRAINE WITHOUT STATUS MIGRAINOSUS, NOT INTRACTABLE: ICD-10-CM

## 2024-12-06 DIAGNOSIS — F33.9 RECURRENT MAJOR DEPRESSIVE DISORDER, REMISSION STATUS UNSPECIFIED (H): ICD-10-CM

## 2024-12-06 DIAGNOSIS — L40.50 PSORIASIS WITH ARTHROPATHY (H): Primary | ICD-10-CM

## 2024-12-06 NOTE — TELEPHONE ENCOUNTER
Last office visit: 5/1/2024 and 04/01/24 with Dr Albright to establish care.   Routing refill request to provider for review/approval because:  Medication is reported/historical  Sera HOWELL RN                       No

## 2024-12-09 RX ORDER — SUMATRIPTAN SUCCINATE 100 MG/1
100 TABLET ORAL
Qty: 15 TABLET | Refills: 3 | Status: SHIPPED | OUTPATIENT
Start: 2024-12-09

## 2024-12-09 RX ORDER — ESCITALOPRAM OXALATE 10 MG/1
10 TABLET ORAL DAILY
Qty: 90 TABLET | Refills: 1 | Status: SHIPPED | OUTPATIENT
Start: 2024-12-09

## 2024-12-09 RX ORDER — UBIDECARENONE 75 MG
100 CAPSULE ORAL DAILY
Qty: 90 TABLET | Refills: 1 | Status: SHIPPED | OUTPATIENT
Start: 2024-12-09

## 2024-12-09 RX ORDER — DESVENLAFAXINE 50 MG/1
50 TABLET, FILM COATED, EXTENDED RELEASE ORAL DAILY
Qty: 90 TABLET | Refills: 1 | Status: SHIPPED | OUTPATIENT
Start: 2024-12-09

## 2024-12-16 NOTE — TELEPHONE ENCOUNTER
Patient is wanting this STAT. Is there a way we can expedite this?     Prior Authorization Retail Medication Request     Medication/Dose: PA Required for desvenlafaxine  Diagnosis and ICD code (if different than what is on RX):    New/renewal/insurance change PA/secondary ins. PA  Previously Tried and Failed:    Rationale:      Insurance   Primary: [employer] [facility]  Insurance ID:  [id number]        Pharmacy Information (if different than what is on RX)  Name:  [facility]  Phone:  [phone]  Fax: [phone]       Thank you,    Eleazar Norwood, PharmD  [facility]- [address] [address]  Phone: [phone]  [email or web address]

## 2024-12-16 NOTE — TELEPHONE ENCOUNTER
Retail Pharmacy Prior Authorization Team   Phone: [phone]    PA Initiation    Medication: DESVENLAFAXINE ER 50 MG PO TB24  Insurance Company: [facility] - Phone [phone] Fax [phone]  Pharmacy Filling the Rx: [place], [place] - [address] [place] [address]  Filling Pharmacy Phone: [phone]  Filling Pharmacy Fax:    Start Date: 12/16/2024    JOCELIN MELENDEZ (Perez: [id number])

## 2024-12-18 NOTE — TELEPHONE ENCOUNTER
PRIOR AUTHORIZATION FOLLOW-UP  Called [facility] and spoke with Dakota - states there are multiple requests on file but it doesn't look like any of the request had clinical information provided in them. She reached out to a senior management to find out what else needs to be done. They manually marked it as urgent so it will be taken care of within 24 hours. They will be sending out a question set soon to obtain the clinical information.

## 2024-12-19 NOTE — TELEPHONE ENCOUNTER
PRIOR AUTHORIZATION FOLLOW-UP  Spoke with Veronica at [facility] - states that they have received all the needed information for this request and it is currently in pharmacist review with a turnaround time of 24 hours.

## 2024-12-19 NOTE — TELEPHONE ENCOUNTER
PRIOR AUTHORIZATION FOLLOW-UP  Still have not received any correspondence from [employer] regarding this prior authorization. I spoke with a rep from [employer] that states the PA is still in progress and they are waiting for additional information. I informed her we have not received any correspondence and she transferred me to clinical staff that was going to start a prior authorization over the phone with me. We started then the phone call got disconnected. I called back and another rep stated they are having issues with their phone line at this time and would call me back.    [employer] system is currently down. Will try again later.

## 2024-12-23 NOTE — TELEPHONE ENCOUNTER
Prior Authorization Approval    30 DAY APPROVAL    Medication: DESVENLAFAXINE ER 50 MG PO TB24  Authorization Effective Date: 12/20/2024  Authorization Expiration Date: 1/20/2025  Insurance Company: [employer] - Phone [phone] Fax [phone]  Which Pharmacy is filling the prescription: [facility] [place], [place] - [address] [place] [address]  Pharmacy Notified: YES  Patient Notified: YES (called pharmacy to notify of approval -     Called [facility] to follow up - spoke with Neal. States approved for 30 days and provided approval dates.

## 2024-12-31 ENCOUNTER — TELEPHONE (OUTPATIENT)
Dept: FAMILY MEDICINE | Facility: CLINIC | Age: 44
End: 2024-12-31

## 2024-12-31 ENCOUNTER — LAB (OUTPATIENT)
Dept: LAB | Facility: CLINIC | Age: 44
End: 2024-12-31
Payer: COMMERCIAL

## 2024-12-31 DIAGNOSIS — N39.0 FREQUENT UTI: ICD-10-CM

## 2024-12-31 LAB
ALBUMIN UR-MCNC: NEGATIVE MG/DL
APPEARANCE UR: ABNORMAL
BACTERIA #/AREA URNS HPF: ABNORMAL /HPF
BILIRUB UR QL STRIP: NEGATIVE
COLOR UR AUTO: YELLOW
GLUCOSE UR STRIP-MCNC: NEGATIVE MG/DL
HGB UR QL STRIP: NEGATIVE
KETONES UR STRIP-MCNC: NEGATIVE MG/DL
LEUKOCYTE ESTERASE UR QL STRIP: ABNORMAL
NITRATE UR QL: NEGATIVE
PH UR STRIP: 6.5 [PH] (ref 5–7)
RBC #/AREA URNS AUTO: ABNORMAL /HPF
SP GR UR STRIP: 1.02 (ref 1–1.03)
UROBILINOGEN UR STRIP-ACNC: 0.2 E.U./DL
WBC #/AREA URNS AUTO: ABNORMAL /HPF

## 2024-12-31 PROCEDURE — 87186 SC STD MICRODIL/AGAR DIL: CPT

## 2024-12-31 PROCEDURE — 87086 URINE CULTURE/COLONY COUNT: CPT

## 2024-12-31 PROCEDURE — 81001 URINALYSIS AUTO W/SCOPE: CPT

## 2024-12-31 NOTE — TELEPHONE ENCOUNTER
General Call      Reason for Call:  Patient came into clinic today to leave urine sample and requesting results and treatment if appropriate. Please call with update.     JOYCE Garcia

## 2025-01-01 ENCOUNTER — E-VISIT (OUTPATIENT)
Dept: URGENT CARE | Facility: CLINIC | Age: 45
End: 2025-01-01
Payer: COMMERCIAL

## 2025-01-01 DIAGNOSIS — N39.0 ACUTE UTI (URINARY TRACT INFECTION): Primary | ICD-10-CM

## 2025-01-01 RX ORDER — NITROFURANTOIN 25; 75 MG/1; MG/1
100 CAPSULE ORAL 2 TIMES DAILY
Qty: 10 CAPSULE | Refills: 0 | Status: SHIPPED | OUTPATIENT
Start: 2025-01-01 | End: 2025-01-01

## 2025-01-01 NOTE — PATIENT INSTRUCTIONS
Dear Zaida Zelaya    After reviewing your urine tests results from yesterday, I've been able to diagnose you with a urinary tract infection, which is a common infection of the bladder with bacteria.  This is not a sexually transmitted infection, though urinating immediately after intercourse can help prevent infections.  Drinking lots of fluids is also helpful to clear your current infection and prevent the next one.      I have sent a prescription for antibiotics (nitrofurantoin) to your pharmacy to treat this infection.  A culture of the urine is already in process, and we'll let you know if a change in treatment is needed.  On reviewing your records, it looks like you get urine infections often, so I would advise that you follow up with your primary doctor or a urologist to discuss management and prevention of these.    It is important that you take all of your prescribed medication even if your symptoms are improving after a few doses.  Taking all of your medicine helps prevent the symptoms from returning.     If your symptoms worsen, you develop pain in your back or stomach, develop fevers, or are not improving in 5 days, please contact your primary care provider for an appointment or visit any of our convenient Walk-in or Urgent Care Centers to be seen, which can be found on our website here.    Thanks again for choosing us as your health care partner,    Trinity Moore MD  Urinary Tract Infection (UTI) in Women: Care Instructions  Overview     A urinary tract infection (UTI) is an infection caused by bacteria. It can happen anywhere in the urinary tract. A UTI can happen in the:  Kidneys.  Ureters, the tubes that connect the kidneys to the bladder.  Bladder.  Urethra, where the urine comes out.  Most UTIs are bladder infections. They often cause pain or burning when you urinate.  Most UTIs can be cured with antibiotics. If you are prescribed antibiotics, be sure to complete your treatment so that the  infection does not get worse.  Follow-up care is a key part of your treatment and safety. Be sure to make and go to all appointments, and call your doctor if you are having problems. It's also a good idea to know your test results and keep a list of the medicines you take.  How can you care for yourself at home?  Take your antibiotics as directed. Do not stop taking them just because you feel better. You need to take the full course of antibiotics.  Drink extra water and other fluids for the next day or two. This will help make the urine less concentrated and help wash out the bacteria that are causing the infection. (If you have kidney, heart, or liver disease and have to limit fluids, talk with your doctor before you increase the amount of fluids you drink.)  Avoid drinks that are carbonated or have caffeine. They can irritate the bladder.  Urinate often. Try to empty your bladder each time.  To relieve pain, take a hot bath or lay a heating pad set on low over your lower belly or genital area. Never go to sleep with a heating pad in place.  To prevent UTIs  Drink plenty of water each day. This helps you urinate often, which clears bacteria from your system. (If you have kidney, heart, or liver disease and have to limit fluids, talk with your doctor before you increase the amount of fluids you drink.)  Urinate when you need to.  If you are sexually active, urinate right after you have sex.  Change sanitary pads often.  Avoid douches, bubble baths, feminine hygiene sprays, and other feminine hygiene products that have deodorants.  After going to the bathroom, wipe from front to back.  When should you call for help?   Call your doctor now or seek immediate medical care if:    You have new or worse fever, chills, nausea, or vomiting.     You have new pain in your back just below your rib cage. This is called flank pain.     There is new blood or pus in your urine.     You have any problems with your antibiotic  "medicine.   Watch closely for changes in your health, and be sure to contact your doctor if:    You are not getting better after taking an antibiotic for 2 days.     Your symptoms go away but then come back.   Where can you learn more?  Go to https://www.[employer].net/patiented  Enter K848 in the search box to learn more about \"Urinary Tract Infection (UTI) in Women: Care Instructions.\"  Current as of: April 30, 2024  Content Version: 14.3    2024 [employer].   Care instructions adapted under license by your healthcare professional. If you have questions about a medical condition or this instruction, always ask your healthcare professional. [employer] disclaims any warranty or liability for your use of this information.    "

## 2025-01-02 LAB — BACTERIA UR CULT: ABNORMAL

## 2025-01-24 ENCOUNTER — MYC MEDICAL ADVICE (OUTPATIENT)
Dept: DERMATOLOGY | Facility: CLINIC | Age: 45
End: 2025-01-24
Payer: COMMERCIAL

## 2025-01-27 NOTE — TELEPHONE ENCOUNTER
Pt has decided to not do the Dupixent at this time and will discuss at next appt.  Jacki MARTINEZ RN BSN PHN  Specialty Clinics

## 2025-02-13 ENCOUNTER — LAB REQUISITION (OUTPATIENT)
Dept: LAB | Facility: CLINIC | Age: 45
End: 2025-02-13

## 2025-02-13 DIAGNOSIS — N39.0 URINARY TRACT INFECTION, SITE NOT SPECIFIED: ICD-10-CM

## 2025-02-13 PROCEDURE — 87086 URINE CULTURE/COLONY COUNT: CPT | Performed by: PHYSICIAN ASSISTANT

## 2025-02-15 LAB — BACTERIA UR CULT: NORMAL

## 2025-02-22 ENCOUNTER — HOSPITAL ENCOUNTER (EMERGENCY)
Facility: CLINIC | Age: 45
Discharge: HOME | End: 2025-02-23
Attending: STUDENT IN AN ORGANIZED HEALTH CARE EDUCATION/TRAINING PROGRAM | Admitting: STUDENT IN AN ORGANIZED HEALTH CARE EDUCATION/TRAINING PROGRAM
Payer: COMMERCIAL

## 2025-02-22 VITALS
RESPIRATION RATE: 18 BRPM | TEMPERATURE: 97.6 F | HEART RATE: 75 BPM | DIASTOLIC BLOOD PRESSURE: 86 MMHG | SYSTOLIC BLOOD PRESSURE: 137 MMHG | OXYGEN SATURATION: 99 %

## 2025-02-22 DIAGNOSIS — T15.91XA EYE FOREIGN BODY, RIGHT, INITIAL ENCOUNTER: ICD-10-CM

## 2025-02-22 PROCEDURE — 99283 EMERGENCY DEPT VISIT LOW MDM: CPT | Performed by: STUDENT IN AN ORGANIZED HEALTH CARE EDUCATION/TRAINING PROGRAM

## 2025-02-22 RX ORDER — TETRACAINE HYDROCHLORIDE 5 MG/ML
1-2 SOLUTION OPHTHALMIC ONCE
Status: COMPLETED | OUTPATIENT
Start: 2025-02-23 | End: 2025-02-23

## 2025-02-23 PROCEDURE — 250N000009 HC RX 250: Performed by: STUDENT IN AN ORGANIZED HEALTH CARE EDUCATION/TRAINING PROGRAM

## 2025-02-23 RX ORDER — ERYTHROMYCIN 5 MG/G
0.5 OINTMENT OPHTHALMIC 4 TIMES DAILY
Qty: 4 G | Refills: 0 | Status: SHIPPED | OUTPATIENT
Start: 2025-02-23

## 2025-02-23 RX ADMIN — TETRACAINE HYDROCHLORIDE 2 DROP: 5 SOLUTION OPHTHALMIC at 00:14

## 2025-02-23 ASSESSMENT — VISUAL ACUITY: OU: 1

## 2025-02-23 NOTE — DISCHARGE INSTRUCTIONS
I removed a small amount of glue from your eyelashes.  I did not see any glue in your eye here in cuts or scratches on your eye.  I think that your symptoms are going to improve in the next couple of days.  Do not start wearing contacts until your symptoms have improved.  You can use the ointment to help soothe the eye.  Make sure you do not keep your superglue bottle near eyedrops to prevent this from occurring in the future.  If you are still having issues after the weekend you should follow-up with an eye doctor.

## 2025-02-23 NOTE — ED TRIAGE NOTES
Right eye pain/redness, accidentally put drop of super glue in thinking it was her eyedrops earlier this evening. States glue mainly got on contact & eyelid, immediately took contact out & irrigated at home for 10-15 mins. Denies new changes to vision.      Triage Assessment (Adult)       Row Name 02/22/25 2343          Triage Assessment    Airway WDL WDL        Respiratory WDL    Respiratory WDL WDL        Skin Circulation/Temperature WDL    Skin Circulation/Temperature WDL WDL        Cardiac WDL    Cardiac WDL WDL        Peripheral/Neurovascular WDL    Peripheral Neurovascular WDL WDL        Cognitive/Neuro/Behavioral WDL    Cognitive/Neuro/Behavioral WDL WDL

## 2025-02-24 NOTE — ED PROVIDER NOTES
"  History     Chief Complaint   Patient presents with    Eye Problem     HPI  Zaida Zelaya is a 44 year old female who presents to the ER with concern that she got superglue in her right eye.  Patient had contacts in and thought they were eyedrops and she put 1 drop of superglue in her right eye.  She states that she thinks most of it got on the contact in her eyelid and she immediately took the contact out and irrigated her eye.  She actually went to a comedy show down in the [facility] and came in here afterwards just to get \"checked out.\"  The event occurred about 10 hours ago.  Patient denies trouble seeing.    Allergies:  Allergies   Allergen Reactions    Chantix [Varenicline] Other (See Comments)     Nightmares, worse depression       Problem List:    Patient Active Problem List    Diagnosis Date Noted    Raynaud's syndrome 02/23/2014     Priority: Medium     Has seen Rheum x 2      Anxiety 02/13/2014     Priority: Medium    Major depression, recurrent 02/13/2014     Priority: Medium    Migraine 02/13/2014     Priority: Medium    Fibromyalgia 02/13/2014     Priority: Medium    Hyperlipidemia with target LDL less than 130 02/13/2014     Priority: Medium     Diagnosis updated by automated process. Provider to review and confirm.      Chronic pain 09/30/2013     Priority: Medium    Tobacco use disorder 04/21/2011     Priority: Medium    Synovitis and tenosynovitis 02/28/2011     Priority: Medium    Dysthymic disorder 11/24/2010     Priority: Medium    Uncomplicated varicose veins 11/12/2010     Priority: Medium    Pain in joint, lower leg 11/12/2010     Priority: Medium        Past Medical History:    No past medical history on file.    Past Surgical History:    Past Surgical History:   Procedure Laterality Date    BREAST SURGERY      CARPAL TUNNEL RELEASE RT/LT  2013    left only; [facility] REAGAN ENLARGE BREAST WITH IMPLANT  2007    RELEASE CARPAL TUNNEL Left        Family History:  "   Family History   Problem Relation Age of Onset    Thyroid Disease Mother     Eye Disorder Mother 50        glaucoma    Depression Father     Neurologic Disorder Father         migraines    Gastrointestinal Disease Father 50        had a colon polyp removed    Asthma Maternal Grandmother 61    Arthritis Maternal Grandmother     Allergies Maternal Grandmother     Alcohol/Drug Maternal Grandfather 61        liver failure    Asthma Paternal Grandmother     Arthritis Paternal Grandmother     Cancer Paternal Grandmother     Cancer Paternal Grandfather 84        melonoma    Alzheimer Disease Paternal Grandfather     Skin Cancer Maternal Aunt        Social History:  Marital Status:  Patient Declined [9]  Social History     Tobacco Use    Smoking status: Every Day     Current packs/day: 1.00     Types: Cigarettes    Smokeless tobacco: Never   Vaping Use    Vaping status: Never Used   Substance Use Topics    Alcohol use: No    Drug use: No        Medications:    erythromycin (ROMYCIN) 5 MG/GM ophthalmic ointment  cyanocobalamin (VITAMIN B-12) 100 MCG tablet  desonide (DESOWEN) 0.05 % external cream  desvenlafaxine (PRISTIQ) 50 MG 24 hr tablet  dupilumab (DUPIXENT) 300 MG/2ML prefilled pen  dupilumab (DUPIXENT) 300 MG/2ML prefilled pen  escitalopram (LEXAPRO) 10 MG tablet  estradiol (ESTRACE) 0.1 MG/GM vaginal cream  ibuprofen (ADVIL/MOTRIN) 800 MG tablet  multivitamin w/minerals (THERA-VIT-M) tablet  ondansetron (ZOFRAN) 4 MG tablet  SUMAtriptan (IMITREX) 100 MG tablet  tretinoin (RETIN-A) 0.025 % external cream          Review of Systems    Physical Exam   BP: 137/86  Pulse: 75  Temp: 97.6  F (36.4  C)  Resp: 18  SpO2: 99 %      Physical Exam  Constitutional:       General: She is not in acute distress.     Appearance: Normal appearance. She is not toxic-appearing.   HENT:      Head: Normocephalic.   Eyes:      General: Lids are normal. Vision grossly intact.         Right eye: No foreign body or discharge.       Extraocular Movements: Extraocular movements intact.      Right eye: Normal extraocular motion.      Conjunctiva/sclera:      Right eye: Right conjunctiva is injected.      Pupils: Pupils are equal, round, and reactive to light.      Right eye: No corneal abrasion or fluorescein uptake. Janak exam negative.      Comments: Matted glue material to the right eyelashes   Pulmonary:      Effort: Pulmonary effort is normal.   Neurological:      General: No focal deficit present.      Mental Status: She is alert.         ED Course        Procedures             Critical Care time:  none           No results found for this or any previous visit (from the past 24 hours).    Medications   tetracaine (PONTOCAINE) 0.5 % ophthalmic solution 1-2 drop (2 drops Right Eye $Given by Other 2/23/25 0014)       Assessments & Plan (with Medical Decision Making)     I have reviewed the nursing notes.    I have reviewed the findings, diagnosis, plan and need for follow up with the patient.          Medical Decision Making  Zaida Zelaya is a 44 year old female who presents to the ER with concern that she got superglue in her right eye.  Vital signs are reviewed and reassuring.  Patient's vision is intact and normal bilaterally.  She had some matted glue to her eyelashes that I was able to remove.  I do not appreciate any foreign body or corneal abrasion.  Her eye has already been thoroughly flushed.  I am going to place her on erythromycin to help soothe the eye.  I instructed her not to wear her contacts until symptoms have completely resolved.  Encouraged her to follow-up with her eye doctor if not improving the next couple of days.  Return precautions discussed.        Discharge Medication List as of 2/23/2025 12:14 AM        START taking these medications    Details   erythromycin (ROMYCIN) 5 MG/GM ophthalmic ointment Place 0.5 inches into the right eye 4 times daily.Disp-4 g, [id number]             Final diagnoses:   Eye  foreign body, right, initial encounter - Superglue       2/22/2025   [facility] EMERGENCY DEPT       Aleksandr Rader MD  02/23/25 [id number]

## 2025-03-02 ENCOUNTER — HEALTH MAINTENANCE LETTER (OUTPATIENT)
Age: 45
End: 2025-03-02